# Patient Record
Sex: FEMALE | Race: WHITE | NOT HISPANIC OR LATINO | URBAN - METROPOLITAN AREA
[De-identification: names, ages, dates, MRNs, and addresses within clinical notes are randomized per-mention and may not be internally consistent; named-entity substitution may affect disease eponyms.]

---

## 2020-07-25 ENCOUNTER — TELEPHONE ENCOUNTER (OUTPATIENT)
Dept: URBAN - METROPOLITAN AREA CLINIC 13 | Facility: CLINIC | Age: 45
End: 2020-07-25

## 2020-07-26 ENCOUNTER — TELEPHONE ENCOUNTER (OUTPATIENT)
Dept: URBAN - METROPOLITAN AREA CLINIC 13 | Facility: CLINIC | Age: 45
End: 2020-07-26

## 2020-07-26 RX ORDER — FLUTICASONE PROPIONATE AND SALMETEROL 50; 100 UG/1; UG/1
POWDER RESPIRATORY (INHALATION)
Qty: 60 | Refills: 0 | Status: ACTIVE | COMMUNITY
Start: 2020-01-20

## 2020-07-26 RX ORDER — SODIUM SULFATE, POTASSIUM SULFATE, MAGNESIUM SULFATE 17.5; 3.13; 1.6 G/ML; G/ML; G/ML
DILUTE CONTENTS AND USE AS DIRECTED FOR BOWEL PREP SOLUTION, CONCENTRATE ORAL
Qty: 1 | Refills: 0 | Status: ACTIVE | COMMUNITY
Start: 2020-01-27

## 2020-07-26 RX ORDER — ALBUTEROL SULFATE 90 UG/1
AEROSOL, METERED RESPIRATORY (INHALATION)
Qty: 18 | Refills: 0 | Status: ACTIVE | COMMUNITY
Start: 2020-01-07

## 2022-12-27 ENCOUNTER — HOSPITAL ENCOUNTER (EMERGENCY)
Facility: HOSPITAL | Age: 47
Discharge: HOME | End: 2022-12-27
Attending: EMERGENCY MEDICINE | Admitting: EMERGENCY MEDICINE

## 2022-12-27 ENCOUNTER — APPOINTMENT (OUTPATIENT)
Dept: CT IMAGING | Facility: HOSPITAL | Age: 47
End: 2022-12-27

## 2022-12-27 ENCOUNTER — APPOINTMENT (OUTPATIENT)
Dept: ULTRASOUND IMAGING | Facility: HOSPITAL | Age: 47
End: 2022-12-27

## 2022-12-27 VITALS
SYSTOLIC BLOOD PRESSURE: 116 MMHG | HEIGHT: 70 IN | TEMPERATURE: 98.9 F | RESPIRATION RATE: 16 BRPM | HEART RATE: 55 BPM | WEIGHT: 218 LBS | DIASTOLIC BLOOD PRESSURE: 67 MMHG | BODY MASS INDEX: 31.21 KG/M2 | OXYGEN SATURATION: 84 %

## 2022-12-27 DIAGNOSIS — R10.13 EPIGASTRIC PAIN: Primary | ICD-10-CM

## 2022-12-27 LAB
ALBUMIN SERPL-MCNC: 4.4 G/DL (ref 3.5–5.2)
ALBUMIN/GLOB SERPL: 1.5 G/DL
ALP SERPL-CCNC: 51 U/L (ref 39–117)
ALT SERPL W P-5'-P-CCNC: 33 U/L (ref 1–33)
ANION GAP SERPL CALCULATED.3IONS-SCNC: 8 MMOL/L (ref 5–15)
AST SERPL-CCNC: 33 U/L (ref 1–32)
BASOPHILS # BLD AUTO: 0.02 10*3/MM3 (ref 0–0.2)
BASOPHILS NFR BLD AUTO: 0.3 % (ref 0–1.5)
BILIRUB SERPL-MCNC: 0.3 MG/DL (ref 0–1.2)
BILIRUB UR QL STRIP: NEGATIVE
BUN SERPL-MCNC: 8 MG/DL (ref 6–20)
BUN/CREAT SERPL: 12.3 (ref 7–25)
CALCIUM SPEC-SCNC: 9.4 MG/DL (ref 8.6–10.5)
CHLORIDE SERPL-SCNC: 106 MMOL/L (ref 98–107)
CLARITY UR: CLEAR
CO2 SERPL-SCNC: 27 MMOL/L (ref 22–29)
COLOR UR: YELLOW
CREAT SERPL-MCNC: 0.65 MG/DL (ref 0.57–1)
DEPRECATED RDW RBC AUTO: 41.7 FL (ref 37–54)
EGFRCR SERPLBLD CKD-EPI 2021: 108.1 ML/MIN/1.73
EOSINOPHIL # BLD AUTO: 0.1 10*3/MM3 (ref 0–0.4)
EOSINOPHIL NFR BLD AUTO: 1.5 % (ref 0.3–6.2)
ERYTHROCYTE [DISTWIDTH] IN BLOOD BY AUTOMATED COUNT: 11.6 % (ref 12.3–15.4)
GLOBULIN UR ELPH-MCNC: 2.9 GM/DL
GLUCOSE SERPL-MCNC: 90 MG/DL (ref 65–99)
GLUCOSE UR STRIP-MCNC: NEGATIVE MG/DL
HCT VFR BLD AUTO: 40.2 % (ref 34–46.6)
HGB BLD-MCNC: 13.2 G/DL (ref 12–15.9)
HGB UR QL STRIP.AUTO: NEGATIVE
HOLD SPECIMEN: NORMAL
IMM GRANULOCYTES # BLD AUTO: 0.02 10*3/MM3 (ref 0–0.05)
IMM GRANULOCYTES NFR BLD AUTO: 0.3 % (ref 0–0.5)
KETONES UR QL STRIP: NEGATIVE
LEUKOCYTE ESTERASE UR QL STRIP.AUTO: NEGATIVE
LIPASE SERPL-CCNC: 33 U/L (ref 13–60)
LYMPHOCYTES # BLD AUTO: 1.68 10*3/MM3 (ref 0.7–3.1)
LYMPHOCYTES NFR BLD AUTO: 24.9 % (ref 19.6–45.3)
MCH RBC QN AUTO: 31.9 PG (ref 26.6–33)
MCHC RBC AUTO-ENTMCNC: 32.8 G/DL (ref 31.5–35.7)
MCV RBC AUTO: 97.1 FL (ref 79–97)
MONOCYTES # BLD AUTO: 0.53 10*3/MM3 (ref 0.1–0.9)
MONOCYTES NFR BLD AUTO: 7.8 % (ref 5–12)
NEUTROPHILS NFR BLD AUTO: 4.41 10*3/MM3 (ref 1.7–7)
NEUTROPHILS NFR BLD AUTO: 65.2 % (ref 42.7–76)
NITRITE UR QL STRIP: NEGATIVE
NRBC BLD AUTO-RTO: 0 /100 WBC (ref 0–0.2)
PH UR STRIP.AUTO: 7.5 [PH] (ref 5–8)
PLATELET # BLD AUTO: 264 10*3/MM3 (ref 140–450)
PMV BLD AUTO: 9.7 FL (ref 6–12)
POTASSIUM SERPL-SCNC: 3.9 MMOL/L (ref 3.5–5.2)
PROT SERPL-MCNC: 7.3 G/DL (ref 6–8.5)
PROT UR QL STRIP: NEGATIVE
QT INTERVAL: 424 MS
QTC INTERVAL: 437 MS
RBC # BLD AUTO: 4.14 10*6/MM3 (ref 3.77–5.28)
SODIUM SERPL-SCNC: 141 MMOL/L (ref 136–145)
SP GR UR STRIP: 1.01 (ref 1–1.03)
TROPONIN T SERPL-MCNC: <0.01 NG/ML (ref 0–0.03)
UROBILINOGEN UR QL STRIP: NORMAL
WBC NRBC COR # BLD: 6.76 10*3/MM3 (ref 3.4–10.8)
WHOLE BLOOD HOLD COAG: NORMAL
WHOLE BLOOD HOLD SPECIMEN: NORMAL

## 2022-12-27 PROCEDURE — 84484 ASSAY OF TROPONIN QUANT: CPT | Performed by: EMERGENCY MEDICINE

## 2022-12-27 PROCEDURE — 96375 TX/PRO/DX INJ NEW DRUG ADDON: CPT

## 2022-12-27 PROCEDURE — 83690 ASSAY OF LIPASE: CPT | Performed by: EMERGENCY MEDICINE

## 2022-12-27 PROCEDURE — 25010000002 HYDROMORPHONE PER 4 MG: Performed by: EMERGENCY MEDICINE

## 2022-12-27 PROCEDURE — 76705 ECHO EXAM OF ABDOMEN: CPT

## 2022-12-27 PROCEDURE — 93005 ELECTROCARDIOGRAM TRACING: CPT | Performed by: EMERGENCY MEDICINE

## 2022-12-27 PROCEDURE — 96374 THER/PROPH/DIAG INJ IV PUSH: CPT

## 2022-12-27 PROCEDURE — 25010000002 ONDANSETRON PER 1 MG: Performed by: EMERGENCY MEDICINE

## 2022-12-27 PROCEDURE — 74177 CT ABD & PELVIS W/CONTRAST: CPT

## 2022-12-27 PROCEDURE — 25010000002 IOPAMIDOL 61 % SOLUTION: Performed by: EMERGENCY MEDICINE

## 2022-12-27 PROCEDURE — 85025 COMPLETE CBC W/AUTO DIFF WBC: CPT | Performed by: EMERGENCY MEDICINE

## 2022-12-27 PROCEDURE — 80053 COMPREHEN METABOLIC PANEL: CPT | Performed by: EMERGENCY MEDICINE

## 2022-12-27 PROCEDURE — 81003 URINALYSIS AUTO W/O SCOPE: CPT | Performed by: EMERGENCY MEDICINE

## 2022-12-27 PROCEDURE — 99284 EMERGENCY DEPT VISIT MOD MDM: CPT

## 2022-12-27 RX ORDER — DICYCLOMINE HCL 20 MG
20 TABLET ORAL EVERY 6 HOURS
Qty: 12 TABLET | Refills: 0 | Status: SHIPPED | OUTPATIENT
Start: 2022-12-27 | End: 2023-01-05

## 2022-12-27 RX ORDER — ALUMINA, MAGNESIA, AND SIMETHICONE 2400; 2400; 240 MG/30ML; MG/30ML; MG/30ML
15 SUSPENSION ORAL ONCE
Status: COMPLETED | OUTPATIENT
Start: 2022-12-27 | End: 2022-12-27

## 2022-12-27 RX ORDER — HYDROMORPHONE HYDROCHLORIDE 1 MG/ML
0.5 INJECTION, SOLUTION INTRAMUSCULAR; INTRAVENOUS; SUBCUTANEOUS ONCE
Status: COMPLETED | OUTPATIENT
Start: 2022-12-27 | End: 2022-12-27

## 2022-12-27 RX ORDER — ONDANSETRON 2 MG/ML
4 INJECTION INTRAMUSCULAR; INTRAVENOUS ONCE
Status: COMPLETED | OUTPATIENT
Start: 2022-12-27 | End: 2022-12-27

## 2022-12-27 RX ORDER — LIDOCAINE HYDROCHLORIDE 20 MG/ML
15 SOLUTION OROPHARYNGEAL ONCE
Status: COMPLETED | OUTPATIENT
Start: 2022-12-27 | End: 2022-12-27

## 2022-12-27 RX ORDER — PANTOPRAZOLE SODIUM 20 MG/1
20 TABLET, DELAYED RELEASE ORAL DAILY
Qty: 30 TABLET | Refills: 0 | Status: SHIPPED | OUTPATIENT
Start: 2022-12-27

## 2022-12-27 RX ORDER — SUCRALFATE 1 G/1
1 TABLET ORAL 4 TIMES DAILY
Qty: 40 TABLET | Refills: 0 | Status: SHIPPED | OUTPATIENT
Start: 2022-12-27

## 2022-12-27 RX ORDER — ALBUTEROL SULFATE 90 UG/1
2 AEROSOL, METERED RESPIRATORY (INHALATION) EVERY 4 HOURS PRN
COMMUNITY

## 2022-12-27 RX ORDER — SODIUM CHLORIDE 0.9 % (FLUSH) 0.9 %
10 SYRINGE (ML) INJECTION AS NEEDED
Status: DISCONTINUED | OUTPATIENT
Start: 2022-12-27 | End: 2022-12-27 | Stop reason: HOSPADM

## 2022-12-27 RX ADMIN — ONDANSETRON 4 MG: 2 INJECTION INTRAMUSCULAR; INTRAVENOUS at 12:19

## 2022-12-27 RX ADMIN — LIDOCAINE HYDROCHLORIDE 15 ML: 20 SOLUTION ORAL; TOPICAL at 12:18

## 2022-12-27 RX ADMIN — HYDROMORPHONE HYDROCHLORIDE 0.5 MG: 1 INJECTION, SOLUTION INTRAMUSCULAR; INTRAVENOUS; SUBCUTANEOUS at 12:27

## 2022-12-27 RX ADMIN — ALUMINUM HYDROXIDE, MAGNESIUM HYDROXIDE, AND DIMETHICONE 15 ML: 400; 400; 40 SUSPENSION ORAL at 12:17

## 2022-12-27 RX ADMIN — IOPAMIDOL 85 ML: 612 INJECTION, SOLUTION INTRAVENOUS at 14:39

## 2022-12-27 NOTE — ED PROVIDER NOTES
Subjective   History of Present Illness  47-year-old female presents emergency department today with a 2-day history of epigastric pain.  She reports the pain is severe at colicky in nature.  She reports it does not radiate through to her back.  She has no chest pain no shortness of breath no diaphoresis associated with this.  Food seems to make this little bit worse.  She is tried Tums over-the-counter antacids and Tylenol without a lot of relief.  No previous abdominal surgeries.  She states that her abdomen is tender to palpation.  She has had no chest lightheadedness.  No fevers no chills denies any urinary symptoms associated with this.    History provided by:  Patient   used: No    Abdominal Pain  Pain location:  Epigastric and RUQ  Pain quality: aching and dull    Pain radiates to:  Does not radiate  Pain severity:  Severe  Onset quality:  Gradual  Duration:  2 days  Timing:  Constant  Progression:  Waxing and waning  Chronicity:  New  Context: not alcohol use, not diet changes, not eating, not previous surgeries, not recent illness, not recent travel, not sick contacts, not suspicious food intake and not trauma    Relieved by:  Nothing  Worsened by:  Eating  Ineffective treatments:  None tried  Associated symptoms: anorexia and nausea    Associated symptoms: no chest pain, no chills, no constipation, no cough, no diarrhea, no dysuria, no fever, no flatus, no hematemesis, no hematochezia, no hematuria, no shortness of breath and no vomiting        Review of Systems   Constitutional: Negative for chills and fever.   Respiratory: Negative for cough, chest tightness and shortness of breath.    Cardiovascular: Negative for chest pain and palpitations.   Gastrointestinal: Positive for abdominal pain, anorexia and nausea. Negative for constipation, diarrhea, flatus, hematemesis, hematochezia and vomiting.   Genitourinary: Negative for dysuria, frequency, hematuria and urgency.   Musculoskeletal:  Negative for back pain and neck pain.   Skin: Negative for pallor and rash.   Psychiatric/Behavioral: Negative.    All other systems reviewed and are negative.      No past medical history on file.    Allergies   Allergen Reactions   • Influenza Vaccines Shortness Of Breath and Palpitations     PT HOSPITALIZED DUE TO INFLUENZA VACCINE. PT UNSURE WHICH VACCINE SHE TOOK.    • Codeine Swelling     SWELLING IN THROAT   12/27 nurse interviewed pt who stated she has had hydromorphone in the past with no issues   • Zithromax [Azithromycin] Swelling     SWELLING IN THROAT       No past surgical history on file.    No family history on file.    Social History     Socioeconomic History   • Marital status:            Objective   Physical Exam  Vitals and nursing note reviewed.   Constitutional:       Appearance: She is well-developed.      Comments: Warm pink dry appears uncomfortable holding her epigastrium.   HENT:      Head: Normocephalic and atraumatic.      Right Ear: External ear normal.      Left Ear: External ear normal.      Nose: Nose normal.   Eyes:      General: No scleral icterus.     Conjunctiva/sclera: Conjunctivae normal.   Neck:      Thyroid: No thyromegaly.   Cardiovascular:      Rate and Rhythm: Normal rate and regular rhythm.      Heart sounds: Normal heart sounds.   Pulmonary:      Effort: Pulmonary effort is normal. No respiratory distress.      Breath sounds: Normal breath sounds. No wheezing or rales.   Chest:      Chest wall: No tenderness.   Abdominal:      General: Bowel sounds are normal. There is no distension.      Palpations: Abdomen is soft.      Tenderness: There is abdominal tenderness in the right upper quadrant and epigastric area. There is no right CVA tenderness, left CVA tenderness, guarding or rebound. Negative signs include Law's sign, Rovsing's sign, McBurney's sign, psoas sign and obturator sign.      Hernia: No hernia is present.   Musculoskeletal:         General: Normal  range of motion.      Cervical back: Normal range of motion.   Lymphadenopathy:      Cervical: No cervical adenopathy.   Skin:     General: Skin is warm and dry.   Neurological:      Mental Status: She is alert and oriented to person, place, and time.      Cranial Nerves: No cranial nerve deficit.      Coordination: Coordination normal.      Deep Tendon Reflexes: Reflexes are normal and symmetric. Reflexes normal.   Psychiatric:         Behavior: Behavior normal.         Thought Content: Thought content normal.         Judgment: Judgment normal.         Procedures           ED Course      Recent Results (from the past 24 hour(s))   Urinalysis With Microscopic If Indicated (No Culture) - Urine, Clean Catch    Collection Time: 12/27/22 11:35 AM    Specimen: Urine, Clean Catch   Result Value Ref Range    Color, UA Yellow Yellow, Straw    Appearance, UA Clear Clear    pH, UA 7.5 5.0 - 8.0    Specific Gravity, UA 1.006 1.001 - 1.030    Glucose, UA Negative Negative    Ketones, UA Negative Negative    Bilirubin, UA Negative Negative    Blood, UA Negative Negative    Protein, UA Negative Negative    Leuk Esterase, UA Negative Negative    Nitrite, UA Negative Negative    Urobilinogen, UA 0.2 E.U./dL 0.2 - 1.0 E.U./dL   ECG 12 Lead ED Triage Standing Order; Abdominal Pain (Upper)    Collection Time: 12/27/22 11:39 AM   Result Value Ref Range    QT Interval 424 ms    QTC Interval 437 ms   Comprehensive Metabolic Panel    Collection Time: 12/27/22 11:45 AM    Specimen: Blood   Result Value Ref Range    Glucose 90 65 - 99 mg/dL    BUN 8 6 - 20 mg/dL    Creatinine 0.65 0.57 - 1.00 mg/dL    Sodium 141 136 - 145 mmol/L    Potassium 3.9 3.5 - 5.2 mmol/L    Chloride 106 98 - 107 mmol/L    CO2 27.0 22.0 - 29.0 mmol/L    Calcium 9.4 8.6 - 10.5 mg/dL    Total Protein 7.3 6.0 - 8.5 g/dL    Albumin 4.4 3.5 - 5.2 g/dL    ALT (SGPT) 33 1 - 33 U/L    AST (SGOT) 33 (H) 1 - 32 U/L    Alkaline Phosphatase 51 39 - 117 U/L    Total Bilirubin 0.3  0.0 - 1.2 mg/dL    Globulin 2.9 gm/dL    A/G Ratio 1.5 g/dL    BUN/Creatinine Ratio 12.3 7.0 - 25.0    Anion Gap 8.0 5.0 - 15.0 mmol/L    eGFR 108.1 >60.0 mL/min/1.73   Lipase    Collection Time: 12/27/22 11:45 AM    Specimen: Blood   Result Value Ref Range    Lipase 33 13 - 60 U/L   Troponin    Collection Time: 12/27/22 11:45 AM    Specimen: Blood   Result Value Ref Range    Troponin T <0.010 0.000 - 0.030 ng/mL   Green Top (Gel)    Collection Time: 12/27/22 11:45 AM   Result Value Ref Range    Extra Tube Hold for add-ons.    Lavender Top    Collection Time: 12/27/22 11:45 AM   Result Value Ref Range    Extra Tube hold for add-on    Gold Top - SST    Collection Time: 12/27/22 11:45 AM   Result Value Ref Range    Extra Tube Hold for add-ons.    Gray Top    Collection Time: 12/27/22 11:45 AM   Result Value Ref Range    Extra Tube Hold for add-ons.    Light Blue Top    Collection Time: 12/27/22 11:45 AM   Result Value Ref Range    Extra Tube Hold for add-ons.    CBC Auto Differential    Collection Time: 12/27/22 11:45 AM    Specimen: Blood   Result Value Ref Range    WBC 6.76 3.40 - 10.80 10*3/mm3    RBC 4.14 3.77 - 5.28 10*6/mm3    Hemoglobin 13.2 12.0 - 15.9 g/dL    Hematocrit 40.2 34.0 - 46.6 %    MCV 97.1 (H) 79.0 - 97.0 fL    MCH 31.9 26.6 - 33.0 pg    MCHC 32.8 31.5 - 35.7 g/dL    RDW 11.6 (L) 12.3 - 15.4 %    RDW-SD 41.7 37.0 - 54.0 fl    MPV 9.7 6.0 - 12.0 fL    Platelets 264 140 - 450 10*3/mm3    Neutrophil % 65.2 42.7 - 76.0 %    Lymphocyte % 24.9 19.6 - 45.3 %    Monocyte % 7.8 5.0 - 12.0 %    Eosinophil % 1.5 0.3 - 6.2 %    Basophil % 0.3 0.0 - 1.5 %    Immature Grans % 0.3 0.0 - 0.5 %    Neutrophils, Absolute 4.41 1.70 - 7.00 10*3/mm3    Lymphocytes, Absolute 1.68 0.70 - 3.10 10*3/mm3    Monocytes, Absolute 0.53 0.10 - 0.90 10*3/mm3    Eosinophils, Absolute 0.10 0.00 - 0.40 10*3/mm3    Basophils, Absolute 0.02 0.00 - 0.20 10*3/mm3    Immature Grans, Absolute 0.02 0.00 - 0.05 10*3/mm3    nRBC 0.0 0.0 - 0.2  /100 WBC     Note: In addition to lab results from this visit, the labs listed above may include labs taken at another facility or during a different encounter within the last 24 hours. Please correlate lab times with ED admission and discharge times for further clarification of the services performed during this visit.    CT Abdomen Pelvis With Contrast   Final Result       1. Mild prominence of the common bile duct likely related to   postcholecystectomy ectasia. Please correlate with bilirubin levels.   Additional evaluation can be performed as clinically indicated.   2. Diffuse hepatic steatosis. Please correlate with liver function test.   3. Fluid within the pelvis along the vaginal cuff likely postoperative   in nature. Follow-up pelvic ultrasound should be considered on a   nonemergent basis to further evaluate               This report was finalized on 12/27/2022 2:53 PM by Yovany Velazquez.          US Gallbladder   Final Result       1. Diffusely echogenic liver parenchyma typical fatty liver change.   2. Gallbladder is not directly visible. Suggestion of a wall-echo-shadow   complex in the gallbladder fossa which may reflect a contracted   gallbladder filled with gallstones. As discussed above, this appearance   can be mimicked by gas in adjacent bowel. Consider CT scan or MRI   scanning for further evaluation.       This report was finalized on 12/27/2022 1:18 PM by Dr. Victoriano Benitez MD.            Vitals:    12/27/22 1200 12/27/22 1230 12/27/22 1330 12/27/22 1400   BP: 125/68 124/60 130/71 116/67   BP Location:       Patient Position:       Pulse: 68 76 73 55   Resp:       Temp:       TempSrc:       SpO2: 100% 100% 99% (!) 84%   Weight:       Height:         Medications   aluminum-magnesium hydroxide-simethicone (MAALOX MAX) 400-400-40 MG/5ML suspension 15 mL (15 mL Oral Given 12/27/22 1217)   Lidocaine Viscous HCl (XYLOCAINE) 2 % solution 15 mL (15 mL Mouth/Throat Given 12/27/22 1218)   HYDROmorphone  (DILAUDID) injection 0.5 mg (0.5 mg Intravenous Given 12/27/22 1227)   ondansetron (ZOFRAN) injection 4 mg (4 mg Intravenous Given 12/27/22 1219)   iopamidol (ISOVUE-300) 61 % injection 85 mL (85 mL Intravenous Given 12/27/22 1439)     ECG/EMG Results (last 24 hours)     Procedure Component Value Units Date/Time    ECG 12 Lead ED Triage Standing Order; Abdominal Pain (Upper) [[id number]] Collected: 12/27/22 1139     Updated: 12/27/22 1455     QT Interval 424 ms      QTC Interval 437 ms     Narrative:      Test Reason : ABD PAIN  Blood Pressure :   */*   mmHG  Vent. Rate :  64 BPM     Atrial Rate :  64 BPM     P-R Int : 140 ms          QRS Dur :  88 ms      QT Int : 424 ms       P-R-T Axes :  61  21  25 degrees     QTc Int : 437 ms    Normal sinus rhythm  Normal ECG  No previous ECGs available  Confirmed by MD Jett Michael ([id number]) on 12/27/2022 2:55:05 PM    Referred By: EDMD           Confirmed By: Chato Jett MD        ECG 12 Lead ED Triage Standing Order; Abdominal Pain (Upper)   Final Result   Test Reason : ABD PAIN   Blood Pressure :   */*   mmHG   Vent. Rate :  64 BPM     Atrial Rate :  64 BPM      P-R Int : 140 ms          QRS Dur :  88 ms       QT Int : 424 ms       P-R-T Axes :  61  21  25 degrees      QTc Int : 437 ms      Normal sinus rhythm   Normal ECG   No previous ECGs available   Confirmed by MD Jett Michael ([id number]) on 12/27/2022 2:55:05 PM      Referred By: EDMD           Confirmed By: Chato Jett MD                                     Recent Results (from the past 24 hour(s))   Urinalysis With Microscopic If Indicated (No Culture) - Urine, Clean Catch    Collection Time: 12/27/22 11:35 AM    Specimen: Urine, Clean Catch   Result Value Ref Range    Color, UA Yellow Yellow, Straw    Appearance, UA Clear Clear    pH, UA 7.5 5.0 - 8.0    Specific Gravity, UA 1.006 1.001 - 1.030    Glucose, UA Negative Negative    Ketones, UA Negative Negative    Bilirubin, UA Negative Negative    Blood, UA Negative  Negative    Protein, UA Negative Negative    Leuk Esterase, UA Negative Negative    Nitrite, UA Negative Negative    Urobilinogen, UA 0.2 E.U./dL 0.2 - 1.0 E.U./dL   ECG 12 Lead ED Triage Standing Order; Abdominal Pain (Upper)    Collection Time: 12/27/22 11:39 AM   Result Value Ref Range    QT Interval 424 ms    QTC Interval 437 ms   Comprehensive Metabolic Panel    Collection Time: 12/27/22 11:45 AM    Specimen: Blood   Result Value Ref Range    Glucose 90 65 - 99 mg/dL    BUN 8 6 - 20 mg/dL    Creatinine 0.65 0.57 - 1.00 mg/dL    Sodium 141 136 - 145 mmol/L    Potassium 3.9 3.5 - 5.2 mmol/L    Chloride 106 98 - 107 mmol/L    CO2 27.0 22.0 - 29.0 mmol/L    Calcium 9.4 8.6 - 10.5 mg/dL    Total Protein 7.3 6.0 - 8.5 g/dL    Albumin 4.4 3.5 - 5.2 g/dL    ALT (SGPT) 33 1 - 33 U/L    AST (SGOT) 33 (H) 1 - 32 U/L    Alkaline Phosphatase 51 39 - 117 U/L    Total Bilirubin 0.3 0.0 - 1.2 mg/dL    Globulin 2.9 gm/dL    A/G Ratio 1.5 g/dL    BUN/Creatinine Ratio 12.3 7.0 - 25.0    Anion Gap 8.0 5.0 - 15.0 mmol/L    eGFR 108.1 >60.0 mL/min/1.73   Lipase    Collection Time: 12/27/22 11:45 AM    Specimen: Blood   Result Value Ref Range    Lipase 33 13 - 60 U/L   Troponin    Collection Time: 12/27/22 11:45 AM    Specimen: Blood   Result Value Ref Range    Troponin T <0.010 0.000 - 0.030 ng/mL   Green Top (Gel)    Collection Time: 12/27/22 11:45 AM   Result Value Ref Range    Extra Tube Hold for add-ons.    Lavender Top    Collection Time: 12/27/22 11:45 AM   Result Value Ref Range    Extra Tube hold for add-on    Gold Top - SST    Collection Time: 12/27/22 11:45 AM   Result Value Ref Range    Extra Tube Hold for add-ons.    Gray Top    Collection Time: 12/27/22 11:45 AM   Result Value Ref Range    Extra Tube Hold for add-ons.    Light Blue Top    Collection Time: 12/27/22 11:45 AM   Result Value Ref Range    Extra Tube Hold for add-ons.    CBC Auto Differential    Collection Time: 12/27/22 11:45 AM    Specimen: Blood   Result  Value Ref Range    WBC 6.76 3.40 - 10.80 10*3/mm3    RBC 4.14 3.77 - 5.28 10*6/mm3    Hemoglobin 13.2 12.0 - 15.9 g/dL    Hematocrit 40.2 34.0 - 46.6 %    MCV 97.1 (H) 79.0 - 97.0 fL    MCH 31.9 26.6 - 33.0 pg    MCHC 32.8 31.5 - 35.7 g/dL    RDW 11.6 (L) 12.3 - 15.4 %    RDW-SD 41.7 37.0 - 54.0 fl    MPV 9.7 6.0 - 12.0 fL    Platelets 264 140 - 450 10*3/mm3    Neutrophil % 65.2 42.7 - 76.0 %    Lymphocyte % 24.9 19.6 - 45.3 %    Monocyte % 7.8 5.0 - 12.0 %    Eosinophil % 1.5 0.3 - 6.2 %    Basophil % 0.3 0.0 - 1.5 %    Immature Grans % 0.3 0.0 - 0.5 %    Neutrophils, Absolute 4.41 1.70 - 7.00 10*3/mm3    Lymphocytes, Absolute 1.68 0.70 - 3.10 10*3/mm3    Monocytes, Absolute 0.53 0.10 - 0.90 10*3/mm3    Eosinophils, Absolute 0.10 0.00 - 0.40 10*3/mm3    Basophils, Absolute 0.02 0.00 - 0.20 10*3/mm3    Immature Grans, Absolute 0.02 0.00 - 0.05 10*3/mm3    nRBC 0.0 0.0 - 0.2 /100 WBC     Note: In addition to lab results from this visit, the labs listed above may include labs taken at another facility or during a different encounter within the last 24 hours. Please correlate lab times with ED admission and discharge times for further clarification of the services performed during this visit.    CT Abdomen Pelvis With Contrast   Final Result       1. Mild prominence of the common bile duct likely related to   postcholecystectomy ectasia. Please correlate with bilirubin levels.   Additional evaluation can be performed as clinically indicated.   2. Diffuse hepatic steatosis. Please correlate with liver function test.   3. Fluid within the pelvis along the vaginal cuff likely postoperative   in nature. Follow-up pelvic ultrasound should be considered on a   nonemergent basis to further evaluate               This report was finalized on 12/27/2022 2:53 PM by Yovany Velazquez.          US Gallbladder   Final Result       1. Diffusely echogenic liver parenchyma typical fatty liver change.   2. Gallbladder is not directly  visible. Suggestion of a wall-echo-shadow   complex in the gallbladder fossa which may reflect a contracted   gallbladder filled with gallstones. As discussed above, this appearance   can be mimicked by gas in adjacent bowel. Consider CT scan or MRI   scanning for further evaluation.       This report was finalized on 12/27/2022 1:18 PM by Dr. Victoriano Benitez MD.            Vitals:    12/27/22 1200 12/27/22 1230 12/27/22 1330 12/27/22 1400   BP: 125/68 124/60 130/71 116/67   BP Location:       Patient Position:       Pulse: 68 76 73 55   Resp:       Temp:       TempSrc:       SpO2: 100% 100% 99% (!) 84%   Weight:       Height:         Medications   aluminum-magnesium hydroxide-simethicone (MAALOX MAX) 400-400-40 MG/5ML suspension 15 mL (15 mL Oral Given 12/27/22 1217)   Lidocaine Viscous HCl (XYLOCAINE) 2 % solution 15 mL (15 mL Mouth/Throat Given 12/27/22 1218)   HYDROmorphone (DILAUDID) injection 0.5 mg (0.5 mg Intravenous Given 12/27/22 1227)   ondansetron (ZOFRAN) injection 4 mg (4 mg Intravenous Given 12/27/22 1219)   iopamidol (ISOVUE-300) 61 % injection 85 mL (85 mL Intravenous Given 12/27/22 1439)     ECG/EMG Results (last 24 hours)     Procedure Component Value Units Date/Time    ECG 12 Lead ED Triage Standing Order; Abdominal Pain (Upper) [[id number]] Collected: 12/27/22 1139     Updated: 12/27/22 1455     QT Interval 424 ms      QTC Interval 437 ms     Narrative:      Test Reason : ABD PAIN  Blood Pressure :   */*   mmHG  Vent. Rate :  64 BPM     Atrial Rate :  64 BPM     P-R Int : 140 ms          QRS Dur :  88 ms      QT Int : 424 ms       P-R-T Axes :  61  21  25 degrees     QTc Int : 437 ms    Normal sinus rhythm  Normal ECG  No previous ECGs available  Confirmed by MD Jett Michael ([id number]) on 12/27/2022 2:55:05 PM    Referred By: EDMD           Confirmed By: Chato Jett MD        ECG 12 Lead ED Triage Standing Order; Abdominal Pain (Upper)   Final Result   Test Reason : ABD PAIN   Blood Pressure :   */*    mmHG   Vent. Rate :  64 BPM     Atrial Rate :  64 BPM      P-R Int : 140 ms          QRS Dur :  88 ms       QT Int : 424 ms       P-R-T Axes :  61  21  25 degrees      QTc Int : 437 ms      Normal sinus rhythm   Normal ECG   No previous ECGs available   Confirmed by MD Jett Michael ([id number]) on 12/27/2022 2:55:05 PM      Referred By: EDMD           Confirmed By: Chato Jett MD                    MDM  Number of Diagnoses or Management Options  Epigastric pain: new and requires workup     Amount and/or Complexity of Data Reviewed  Clinical lab tests: reviewed and ordered  Tests in the radiology section of CPT®: ordered and reviewed  Tests in the medicine section of CPT®: ordered and reviewed  Discuss the patient with other providers: yes    Patient Progress  Patient progress: stable      Final diagnoses:   Epigastric pain       ED Disposition  ED Disposition     ED Disposition   Discharge    Condition   Stable    Comment   --             [facility] GA[facility] [address]  [address]  [facility] [address]  [phone]        Gatito Perez MD  [address] [place] [address] [address] [place] [address] [phone]      to discuss HIDA scan         Medication List      New Prescriptions    dicyclomine 20 MG tablet  Commonly known as: BENTYL  Take 1 tablet by mouth Every 6 (Six) Hours.     pantoprazole 20 MG EC tablet  Commonly known as: PROTONIX  Take 1 tablet by mouth Daily.     sucralfate 1 g tablet  Commonly known as: CARAFATE  Take 1 tablet by mouth 4 (Four) Times a Day.           Where to Get Your Medications      These medications were sent to [facility] - [place], [place] - [address] [facility] [address] - [phone]  - [phone] [address] [address]    Phone: [phone]   · dicyclomine 20 MG tablet  · pantoprazole 20 MG EC tablet  · sucralfate 1 g tablet          Abe Powers PA  12/28/22 3909

## 2023-01-05 ENCOUNTER — OFFICE VISIT (OUTPATIENT)
Dept: ORTHOPEDIC SURGERY | Facility: CLINIC | Age: 48
End: 2023-01-05
Payer: OTHER GOVERNMENT

## 2023-01-05 VITALS
SYSTOLIC BLOOD PRESSURE: 101 MMHG | BODY MASS INDEX: 31.21 KG/M2 | WEIGHT: 218 LBS | HEIGHT: 70 IN | DIASTOLIC BLOOD PRESSURE: 71 MMHG

## 2023-01-05 DIAGNOSIS — M70.61 GREATER TROCHANTERIC BURSITIS OF RIGHT HIP: ICD-10-CM

## 2023-01-05 DIAGNOSIS — M67.951 TENDINOPATHY OF RIGHT GLUTEUS MEDIUS: Primary | ICD-10-CM

## 2023-01-05 PROCEDURE — 99204 OFFICE O/P NEW MOD 45 MIN: CPT | Performed by: STUDENT IN AN ORGANIZED HEALTH CARE EDUCATION/TRAINING PROGRAM

## 2023-01-05 RX ORDER — CELECOXIB 200 MG/1
200 CAPSULE ORAL
Qty: 60 CAPSULE | Refills: 1 | Status: SHIPPED | OUTPATIENT
Start: 2023-01-05

## 2023-01-05 RX ORDER — BISOPROLOL FUMARATE 5 MG/1
TABLET, FILM COATED ORAL
COMMUNITY
Start: 2022-11-29

## 2023-01-05 RX ORDER — LEVOCETIRIZINE DIHYDROCHLORIDE 5 MG/1
TABLET, FILM COATED ORAL
COMMUNITY

## 2023-01-05 NOTE — PROGRESS NOTES
[facility] Office Visit     Office Visit       Date: 01/05/2023   Patient Name: Alyssa Somers  MRN: [id number]  YOB: 1975    Referring Physician: Gatito Perez MD     Chief Complaint:   Chief Complaint   Patient presents with   • Right Hip - Pain       History of Present Illness:   Alyssa Somers is a 47 y.o. female who presents with right hip pain for 4 month(s). Onset atraumatic and gradual in nature. Pain is localized to lateral trochanter and is a 4/10 on the pain scale.Pain is described as burning. Associated symptoms include pain, stiffness and giving way/buckling.  The pain is worse with walking, standing, sitting, climbing stairs, sleeping, working and leisure; Prednisone improve the pain. Previous treatments have included: NSAIDS and oral steroids since symptom onset. Although some transient relief was reported with these interventions, these conservative measures have failed and symptoms have persisted. The patient is limited in daily activities and has had a significant decrease in quality of life as a result. She denies fevers, chills, or constitutional symptoms.    Subjective   Review of Systems: Review of Systems   Constitutional: Negative for chills, fever, unexpected weight gain and unexpected weight loss.   HENT: Negative for congestion, postnasal drip and rhinorrhea.    Eyes: Negative for blurred vision.   Respiratory: Negative for shortness of breath.    Cardiovascular: Negative for leg swelling.   Gastrointestinal: Negative for abdominal pain, nausea and vomiting.   Genitourinary: Negative for difficulty urinating.   Musculoskeletal: Positive for arthralgias. Negative for gait problem, joint swelling and myalgias.   Skin: Negative for skin lesions and wound.   Neurological: Negative for dizziness, weakness, light-headedness and numbness.   Hematological: Does not bruise/bleed easily.   Psychiatric/Behavioral: Negative for  depressed mood.        I have reviewed the following portions of the patient's history:History of Present Illness and review of systems.    Past Medical History:   Past Medical History:   Diagnosis Date   • Tear of meniscus of knee 06/2020    Left knee surgery 6/30/2020 in [place]       Past Surgical History:   Past Surgical History:   Procedure Laterality Date   • HYSTERECTOMY     • KNEE SURGERY Left 06/30/2020    arthroscopy   • TONSILLECTOMY AND ADENOIDECTOMY         Family History:   Family History   Problem Relation Age of Onset   • Cancer Mother        Social History:   Social History     Socioeconomic History   • Marital status:    Tobacco Use   • Smoking status: Never   • Smokeless tobacco: Never   Vaping Use   • Vaping Use: Never used   Substance and Sexual Activity   • Alcohol use: Not Currently   • Drug use: Never   • Sexual activity: Yes     Partners: Male     Birth control/protection: Hysterectomy       Medications:   Current Outpatient Medications:   •  albuterol sulfate  (90 Base) MCG/ACT inhaler, Inhale 2 puffs Every 4 (Four) Hours As Needed for Wheezing., Disp: , Rfl:   •  bisoprolol (ZEBeta) 5 MG tablet, , Disp: , Rfl:   •  Fluticasone-Umeclidin-Vilant (TRELEGY ELLIPTA IN), Inhale 100 mcg., Disp: , Rfl:   •  levocetirizine (XYZAL) 5 MG tablet, Xyzal 5 mg tablet  Take 1 tablet every day by oral route at bedtime., Disp: , Rfl:   •  pantoprazole (PROTONIX) 20 MG EC tablet, Take 1 tablet by mouth Daily., Disp: 30 tablet, Rfl: 0  •  Rimegepant Sulfate (NURTEC PO), Take 75 mg by mouth As Needed., Disp: , Rfl:   •  sucralfate (CARAFATE) 1 g tablet, Take 1 tablet by mouth 4 (Four) Times a Day., Disp: 40 tablet, Rfl: 0  •  BISOPROLOL FUMARATE PO, Take 5 mg by mouth., Disp: , Rfl:   •  celecoxib (CeleBREX) 200 MG capsule, Take 1 capsule by mouth Daily With Breakfast. Take 1 tablet 2 times per day., Disp: 60 capsule, Rfl: 1    Allergies:   Allergies   Allergen Reactions   • Influenza  Vaccines Shortness Of Breath and Palpitations     PT HOSPITALIZED DUE TO INFLUENZA VACCINE. PT UNSURE WHICH VACCINE SHE TOOK.    • Codeine Swelling     SWELLING IN THROAT   12/27 nurse interviewed pt who stated she has had hydromorphone in the past with no issues   • Zithromax [Azithromycin] Swelling     SWELLING IN THROAT       I reviewed the patient's chief complaint, history of present illness, review of systems, past medical history, surgical history, family history, social history, medications and allergy list.     Objective    Vital Signs:   Vitals:    01/05/23 1539   BP: 101/71   Weight: 98.9 kg (218 lb)   Height: 177.8 cm (70\")     Body mass index is 31.28 kg/m².   BMI is >= 30 and <35. (Class 1 Obesity). The following options were offered after discussion;: exercise counseling/recommendations and nutrition counseling/recommendations     Patient reports that she is a non-smoker and has not ever been a smoker.  This behavior was applauded and she was encouraged to continue in smoking cessation.  We will continue to monitor at subsequent visits.    Ortho Exam:  Constitutional: General Appearance: healthy-appearing and NAD.   Psychiatric: Orientation: oriented to time, place, and person. Mood and Affect: normal affect and mood and active and alert.   Gait and Station: Appearance: ambulating with no assistive devices and limp.   Hip/Pelvis Appearance: Inspection: normal axial alignment and pelvis level.   Hips: Bony Palpation Right: tenderness of the greater trochanter. Bony Palpation Left: no tenderness of the iliac crest, the ASIS, the PSIS, the pubic tubercle, the sciatic notch, the ischial tuberosity, the SI joint, or the greater trochanter. Passive Range of Motion Right: no flexion contracture, hamstring tightness popliteal angle, or pain with motion and normal, flexion normal, extension normal, internal rotation normal, and external rotation normal. Passive Range of Motion Left: no flexion contracture,  hamstring tightness popliteal angle, or pain with motion and normal, flexion normal, extension normal, internal rotation normal, and external rotation normal. Strength Right: normal 5/5. Strength Left: normal 5/5.   Skin: Right Lower Extremity: normal. Left Lower Extremity: normal.   right hip exam:   Normal hip contour without evidence of ecchymosis or swelling.   Range of motion of the hip shows pain only is exacerbated with Mayte test, Straight Leg Raise.   Negative log roll, FADIR.   Hip flexion 110°, internal rotation 10°, external rotation 60°.   Tenderness to palpation greatest at the greater trochanter region.   Mild tenderness along the iliotibial band.   Mild tenderness at the gluteal region.   Negative tenderness at the ischial tuberosity.   Negative SI joint tenderness to palpation.  left hip exam:   Normal hip contour without evidence of swelling or echymosis.   Full range of motion without exacerbation of pain.   Negative tenderness to palpation throughout.    Results Review:   Imaging Results (Last 24 Hours)     ** No results found for the last 24 hours. **        Procedures    Assessment / Plan    Assessment/Plan:   Diagnoses and all orders for this visit:    1. Tendinopathy of right gluteus medius (Primary)  -     Ambulatory Referral to Physical Therapy Evaluate and treat, Ortho; Electrotherapy; E-stim, Iontophoresis; Soft Tissue Mobilizaton; Stretching, ROM, Strengthening  -     celecoxib (CeleBREX) 200 MG capsule; Take 1 capsule by mouth Daily With Breakfast. Take 1 tablet 2 times per day.  Dispense: 60 capsule; Refill: 1    2. Greater trochanteric bursitis of right hip      Almost 6 months of right lateral hip pain worsened by standing walking any movement of the hip.  Radiographs from outside facility do show degenerative changes in the hip joint but this is not a generator of her pain.  On exam, manipulation of the hip reproduces pain at the lateral hip/greater trochanter.      We discussed my  findings and treatment options. Treatment always begins with nonoperative management which includes ITB stretches, NSAIDs, PT if deemed necessary. Local steroid injections can also be effective and were discussed. We discussed the role that gait disturbance can play in causation. Surgical Treatment is reserved to very intractible cases.    At this time, we will initiate treatment with physical therapy to improve her range of motion and decrease her pain threshold.  I will prescribe an anti-inflammatory medicine that can be taken twice daily to improve her pain as well.  I plan to see her back in 5 weeks to monitor response and decide on additional treatments.  Should she have an incomplete response, I would likely look at ultrasound-guided injection into the subglute max bursa.    Past documentation reviewed: 12/8/2022-office visit-Gatito Perez MD.    Past imaging studies reviewed: 12/27/2022-glucose 90, creatinine 0.65, EGFR 108.1.    Past imaging studies reviewed: Radiographs of the right hip from 12/8/2022-moderate hip osteoarthritis, no spurring at the greater trochanter.    Follow Up:   Return in about 5 weeks (around 2/9/2023) for Recheck.      Lg Duff MD  [facility]

## 2023-01-18 ENCOUNTER — OFFICE (OUTPATIENT)
Dept: URBAN - METROPOLITAN AREA CLINIC 4 | Facility: CLINIC | Age: 48
End: 2023-01-18

## 2023-01-18 VITALS — WEIGHT: 213 LBS | SYSTOLIC BLOOD PRESSURE: 138 MMHG | DIASTOLIC BLOOD PRESSURE: 80 MMHG

## 2023-01-18 DIAGNOSIS — R10.13 EPIGASTRIC PAIN: ICD-10-CM

## 2023-01-18 DIAGNOSIS — R63.0 ANOREXIA: ICD-10-CM

## 2023-01-18 DIAGNOSIS — R10.11 RIGHT UPPER QUADRANT PAIN: ICD-10-CM

## 2023-01-18 DIAGNOSIS — K76.9 LIVER DISEASE, UNSPECIFIED: ICD-10-CM

## 2023-01-18 DIAGNOSIS — K83.9 DISEASE OF BILIARY TRACT, UNSPECIFIED: ICD-10-CM

## 2023-01-18 DIAGNOSIS — R63.4 ABNORMAL WEIGHT LOSS: ICD-10-CM

## 2023-01-18 PROCEDURE — 99204 OFFICE O/P NEW MOD 45 MIN: CPT | Performed by: NURSE PRACTITIONER

## 2023-01-23 ENCOUNTER — OFFICE (OUTPATIENT)
Dept: URBAN - METROPOLITAN AREA PATHOLOGY 4 | Facility: PATHOLOGY | Age: 48
End: 2023-01-23

## 2023-01-23 ENCOUNTER — AMBULATORY SURGICAL CENTER (OUTPATIENT)
Dept: URBAN - METROPOLITAN AREA SURGERY 10 | Facility: SURGERY | Age: 48
End: 2023-01-23

## 2023-01-23 DIAGNOSIS — K31.89 OTHER DISEASES OF STOMACH AND DUODENUM: ICD-10-CM

## 2023-01-23 DIAGNOSIS — K44.9 DIAPHRAGMATIC HERNIA WITHOUT OBSTRUCTION OR GANGRENE: ICD-10-CM

## 2023-01-23 DIAGNOSIS — K21.00 GASTRO-ESOPHAGEAL REFLUX DISEASE WITH ESOPHAGITIS, WITHOUT B: ICD-10-CM

## 2023-01-23 DIAGNOSIS — K29.70 GASTRITIS, UNSPECIFIED, WITHOUT BLEEDING: ICD-10-CM

## 2023-01-23 DIAGNOSIS — R10.13 EPIGASTRIC PAIN: ICD-10-CM

## 2023-01-23 PROCEDURE — 88305 TISSUE EXAM BY PATHOLOGIST: CPT | Performed by: INTERNAL MEDICINE

## 2023-01-23 PROCEDURE — 43239 EGD BIOPSY SINGLE/MULTIPLE: CPT | Performed by: INTERNAL MEDICINE

## 2023-01-24 PROBLEM — K30 DYSPEPSIA: Status: ACTIVE | Noted: 2023-01-24

## 2023-01-26 PROBLEM — R10.13 ABDOMINAL PAIN - EPIGASTRIC: Status: ACTIVE | Noted: 2023-01-24

## 2024-07-16 ENCOUNTER — TELEPHONE (OUTPATIENT)
Dept: FAMILY MEDICINE CLINIC | Facility: CLINIC | Age: 49
End: 2024-07-16

## 2024-07-16 ENCOUNTER — OFFICE VISIT (OUTPATIENT)
Dept: FAMILY MEDICINE CLINIC | Facility: CLINIC | Age: 49
End: 2024-07-16
Payer: OTHER GOVERNMENT

## 2024-07-16 VITALS
BODY MASS INDEX: 30.28 KG/M2 | WEIGHT: 211.5 LBS | HEIGHT: 70 IN | SYSTOLIC BLOOD PRESSURE: 138 MMHG | OXYGEN SATURATION: 98 % | DIASTOLIC BLOOD PRESSURE: 84 MMHG | HEART RATE: 64 BPM

## 2024-07-16 DIAGNOSIS — R20.8 DYSESTHESIA OF MULTIPLE SITES: ICD-10-CM

## 2024-07-16 DIAGNOSIS — M25.511 PAIN OF BOTH SHOULDER JOINTS: ICD-10-CM

## 2024-07-16 DIAGNOSIS — R20.2 PARESTHESIA: ICD-10-CM

## 2024-07-16 DIAGNOSIS — M79.10 MYALGIA: ICD-10-CM

## 2024-07-16 DIAGNOSIS — M79.7 FIBROMYALGIA: ICD-10-CM

## 2024-07-16 DIAGNOSIS — M25.512 PAIN OF BOTH SHOULDER JOINTS: ICD-10-CM

## 2024-07-16 DIAGNOSIS — M54.2 NECK PAIN: Primary | ICD-10-CM

## 2024-07-16 PROBLEM — K83.01 PRIMARY SCLEROSING CHOLANGITIS: Status: ACTIVE | Noted: 2024-07-16

## 2024-07-16 PROCEDURE — 99204 OFFICE O/P NEW MOD 45 MIN: CPT | Performed by: INTERNAL MEDICINE

## 2024-07-16 RX ORDER — MULTIPLE VITAMINS W/ MINERALS TAB 9MG-400MCG
1 TAB ORAL DAILY
COMMUNITY

## 2024-07-16 RX ORDER — DULOXETIN HYDROCHLORIDE 30 MG/1
30 CAPSULE, DELAYED RELEASE ORAL DAILY
Qty: 30 CAPSULE | Refills: 1 | Status: SHIPPED | OUTPATIENT
Start: 2024-07-16

## 2024-07-16 RX ORDER — METHOCARBAMOL 500 MG/1
TABLET, FILM COATED ORAL
Qty: 30 TABLET | Refills: 0 | Status: SHIPPED | OUTPATIENT
Start: 2024-07-16

## 2024-07-16 RX ORDER — HYDROXYZINE HYDROCHLORIDE 25 MG/1
25 TABLET, FILM COATED ORAL AS NEEDED
COMMUNITY
Start: 2024-02-13

## 2024-07-16 RX ORDER — THIAMINE HCL 100 MG
TABLET ORAL DAILY
COMMUNITY

## 2024-07-16 NOTE — PROGRESS NOTES
Office Note     Name: Alyssa Somers    : 1975     MRN: [id number]     Chief Complaint  Establish Care and Pain (Pain in arms and neck. Physical therapy has not helped. Patient reports it is interfering with daily life and needs help putting on clothes.)    Subjective     History of Present Illness:  Alyssa Somers is a 49 y.o. female who presents today for:      Previous PCP Dr Wills.  Had Been doing PT through [facility]  Sees  Gastro for PSC  Sees Dr Victor (cardiology) for rapid heart rate and takes bisoprolol.  Asthma: mostly seasonal, usually worse in spring / fall. Was on Trelegy but didn't feel like it helped.  GYN: None, has not had recent annual/mammo    Works as a .    Early  started having burning stabbing pain in the upper right arm. Mostly in the front and outer part of the arm. Has also developed in left arm now but is worse in the upper right arm.  Has also had neck and shoulder pain the past several months. Cannot lift the arms due to severe pain in both arms, cannot shave or wash herself  has to help.  Hurts to lay flat.  Was prescribed muscle relaxer but was upset because it was a very low dose so they did not think it would work (methocarbamal 750 Once a day just on therapy days). Took a leftover oxycodone from her blly surgery but knocked her out and gave her a headache.    Has been doing physical therapy since , has done 16 visits with PT as well as home exercise.  Physical therapy has not been able to help see what's going.    Has not had any arm imaging.    Also feels very tender to the touch, often hurts just to be hugged or touched.    No history of injury trauma or fall.  No illnesses leading up to it.    Has  had inflammatory markers done which were reportedly normal.           Review of Systems:   Review of Systems    Past Medical History:   Past Medical History:   Diagnosis Date    Allergic     Asthma     History of medical problems      PSC(2023)/rapid heart w extra beat(2021)    Tear of meniscus of knee 06/2020    Left knee surgery 6/30/2020 in [place]       Past Surgical History:   Past Surgical History:   Procedure Laterality Date    COLONOSCOPY  2020    FRACTURE SURGERY  2019/2020    Knee surgery    HYSTERECTOMY      HYSTERECTOMY  9/2008    KNEE SURGERY Left 06/30/2020    arthroscopy meniscal tear    TONSILLECTOMY AND ADENOIDECTOMY         Family History:   Family History   Problem Relation Age of Onset    Cancer Mother         Systemic amlyoidosis    Thyroid disease Mother     COPD Father        Social History:   Social History     Socioeconomic History    Marital status:    Tobacco Use    Smoking status: Never     Passive exposure: Never    Smokeless tobacco: Never   Vaping Use    Vaping status: Never Used   Substance and Sexual Activity    Alcohol use: Not Currently    Drug use: Never    Sexual activity: Yes     Partners: Male     Birth control/protection: Hysterectomy       Immunizations:   Immunization History   Administered Date(s) Administered    Tdap 07/01/2021        Medications:     Current Outpatient Medications:     albuterol sulfate  (90 Base) MCG/ACT inhaler, Inhale 2 puffs Every 4 (Four) Hours As Needed for Wheezing., Disp: , Rfl:     bisoprolol (ZEBeta) 5 MG tablet, , Disp: , Rfl:     Calcium Citrate-Vitamin D3 (CITRACAL) 315-6.25 MG-MCG tablet tablet, Take  by mouth Daily., Disp: , Rfl:     hydrOXYzine (ATARAX) 25 MG tablet, Take 1 tablet by mouth As Needed for Itching., Disp: , Rfl:     levocetirizine (XYZAL) 5 MG tablet, Xyzal 5 mg tablet  Take 1 tablet every day by oral route at bedtime., Disp: , Rfl:     multivitamin with minerals (CENTRUM SILVER 50+WOMEN PO), Take 1 tablet by mouth Daily., Disp: , Rfl:     Rimegepant Sulfate (NURTEC PO), Take 75 mg by mouth As Needed., Disp: , Rfl:     celecoxib (CeleBREX) 200 MG capsule, Take 1 capsule by mouth Daily With Breakfast. Take 1 tablet 2 times per day.  "(Patient not taking: Reported on 7/16/2024), Disp: 60 capsule, Rfl: 1      Allergies:   Allergies   Allergen Reactions    Influenza Vaccines Shortness Of Breath and Palpitations     PT HOSPITALIZED DUE TO INFLUENZA VACCINE. PT UNSURE WHICH VACCINE SHE TOOK.     Codeine Swelling     SWELLING IN THROAT   12/27 nurse interviewed pt who stated she has had hydromorphone in the past with no issues    Zithromax [Azithromycin] Swelling     SWELLING IN THROAT       Objective     Vital Signs  /84 (BP Location: Left arm, Patient Position: Sitting, Cuff Size: Adult)   Pulse 64   Ht 177.8 cm (70\")   Wt 95.9 kg (211 lb 8 oz)   SpO2 98%   BMI 30.35 kg/m²   Estimated body mass index is 30.35 kg/m² as calculated from the following:    Height as of this encounter: 177.8 cm (70\").    Weight as of this encounter: 95.9 kg (211 lb 8 oz).          Physical Exam  Vitals and nursing note reviewed.   Constitutional:       Appearance: Normal appearance.   Cardiovascular:      Rate and Rhythm: Normal rate and regular rhythm.      Heart sounds: No murmur heard.     No friction rub. No gallop.   Pulmonary:      Effort: Pulmonary effort is normal.      Breath sounds: Normal breath sounds. No wheezing, rhonchi or rales.   Musculoskeletal:      Comments: Multiple tender.  Trigger points in the shoulders mid back upper extremities and waist.  She also has significant dysesthesia expresses pain at light touch and non-painful stimuli   Neurological:      Mental Status: She is alert.          Procedures     Assessment and Plan     Diagnoses:    ICD-10-CM ICD-9-CM   1. Neck pain  M54.2 723.1   2. Pain of both shoulder joints  M25.511 719.41    M25.512    3. Myalgia  M79.10 729.1   4. Fibromyalgia  M79.7 729.1   5. Paresthesia  R20.2 782.0   6. Dysesthesia of multiple sites  R20.8 782.0       Plan:  1. Neck pain    - CBC & Differential  - Comprehensive Metabolic Panel  - TSH Rfx On Abnormal To Free T4  - TORO by IFA, Reflex to Titer and " Pattern  - Rheumatoid Factor  - CK  - C-reactive Protein  - Sedimentation Rate  - Vitamin B12  - Folate  - XR Shoulder 2+ View Bilateral (In Office)  - XR Spine Cervical 2 or 3 View    2. Pain of both shoulder joints    - CBC & Differential  - Comprehensive Metabolic Panel  - TSH Rfx On Abnormal To Free T4  - TORO by IFA, Reflex to Titer and Pattern  - Rheumatoid Factor  - CK  - C-reactive Protein  - Sedimentation Rate  - Vitamin B12  - Folate  - XR Shoulder 2+ View Bilateral (In Office)  - XR Spine Cervical 2 or 3 View    3. Myalgia    - CBC & Differential  - Comprehensive Metabolic Panel  - TSH Rfx On Abnormal To Free T4  - TORO by IFA, Reflex to Titer and Pattern  - Rheumatoid Factor  - CK  - C-reactive Protein  - Sedimentation Rate  - Vitamin B12  - Folate  - XR Shoulder 2+ View Bilateral (In Office)  - XR Spine Cervical 2 or 3 View    4. Fibromyalgia    - CBC & Differential  - Comprehensive Metabolic Panel  - TSH Rfx On Abnormal To Free T4  - TORO by IFA, Reflex to Titer and Pattern  - Rheumatoid Factor  - CK  - C-reactive Protein  - Sedimentation Rate  - Vitamin B12  - Folate  - XR Shoulder 2+ View Bilateral (In Office)  - XR Spine Cervical 2 or 3 View  - DULoxetine (CYMBALTA) 30 MG capsule; Take 1 capsule by mouth Daily.  Dispense: 30 capsule; Refill: 1    5. Paresthesia    - CBC & Differential  - Comprehensive Metabolic Panel  - TSH Rfx On Abnormal To Free T4  - TORO by IFA, Reflex to Titer and Pattern  - Rheumatoid Factor  - CK  - C-reactive Protein  - Sedimentation Rate  - Vitamin B12  - Folate  - XR Shoulder 2+ View Bilateral (In Office)  - XR Spine Cervical 2 or 3 View    6. Dysesthesia of multiple sites         Follow Up  Return in about 4 weeks (around 8/13/2024).    Patient was advised to call the office or seek medical care if  any issues discussed during this visit worsen or persist or if new concerns arise        MD ROCAEL Singh [facility]  [address] [facility] [id number] [phone]

## 2024-07-17 LAB
ALBUMIN SERPL-MCNC: 4.6 G/DL (ref 3.8–4.9)
ALP SERPL-CCNC: 55 IU/L (ref 44–121)
ALT SERPL-CCNC: 22 IU/L (ref 0–32)
ANA SER QL IF: NEGATIVE
AST SERPL-CCNC: 30 IU/L (ref 0–40)
BASOPHILS # BLD AUTO: 0 X10E3/UL (ref 0–0.2)
BASOPHILS NFR BLD AUTO: 0 %
BILIRUB SERPL-MCNC: 0.4 MG/DL (ref 0–1.2)
BUN SERPL-MCNC: 9 MG/DL (ref 6–24)
BUN/CREAT SERPL: 11 (ref 9–23)
CALCIUM SERPL-MCNC: 10.4 MG/DL (ref 8.7–10.2)
CHLORIDE SERPL-SCNC: 103 MMOL/L (ref 96–106)
CK SERPL-CCNC: 67 U/L (ref 32–182)
CO2 SERPL-SCNC: 26 MMOL/L (ref 20–29)
CREAT SERPL-MCNC: 0.82 MG/DL (ref 0.57–1)
CRP SERPL-MCNC: 4 MG/L (ref 0–10)
EGFRCR SERPLBLD CKD-EPI 2021: 87 ML/MIN/1.73
EOSINOPHIL # BLD AUTO: 0.1 X10E3/UL (ref 0–0.4)
EOSINOPHIL NFR BLD AUTO: 2 %
ERYTHROCYTE [DISTWIDTH] IN BLOOD BY AUTOMATED COUNT: 12.2 % (ref 11.7–15.4)
ERYTHROCYTE [SEDIMENTATION RATE] IN BLOOD BY WESTERGREN METHOD: 8 MM/HR (ref 0–40)
FOLATE SERPL-MCNC: >20 NG/ML
GLOBULIN SER CALC-MCNC: 2.4 G/DL (ref 1.5–4.5)
GLUCOSE SERPL-MCNC: 89 MG/DL (ref 70–99)
HCT VFR BLD AUTO: 41.1 % (ref 34–46.6)
HGB BLD-MCNC: 13.8 G/DL (ref 11.1–15.9)
IMM GRANULOCYTES # BLD AUTO: 0 X10E3/UL (ref 0–0.1)
IMM GRANULOCYTES NFR BLD AUTO: 0 %
LYMPHOCYTES # BLD AUTO: 1.9 X10E3/UL (ref 0.7–3.1)
LYMPHOCYTES NFR BLD AUTO: 34 %
MCH RBC QN AUTO: 32.1 PG (ref 26.6–33)
MCHC RBC AUTO-ENTMCNC: 33.6 G/DL (ref 31.5–35.7)
MCV RBC AUTO: 96 FL (ref 79–97)
MONOCYTES # BLD AUTO: 0.5 X10E3/UL (ref 0.1–0.9)
MONOCYTES NFR BLD AUTO: 9 %
NEUTROPHILS # BLD AUTO: 3 X10E3/UL (ref 1.4–7)
NEUTROPHILS NFR BLD AUTO: 55 %
PLATELET # BLD AUTO: 220 X10E3/UL (ref 150–450)
POTASSIUM SERPL-SCNC: 4.8 MMOL/L (ref 3.5–5.2)
PROT SERPL-MCNC: 7 G/DL (ref 6–8.5)
RBC # BLD AUTO: 4.3 X10E6/UL (ref 3.77–5.28)
RHEUMATOID FACT SERPL-ACNC: <10 IU/ML
SODIUM SERPL-SCNC: 140 MMOL/L (ref 134–144)
TSH SERPL DL<=0.005 MIU/L-ACNC: 4.22 UIU/ML (ref 0.45–4.5)
VIT B12 SERPL-MCNC: 762 PG/ML (ref 232–1245)
WBC # BLD AUTO: 5.5 X10E3/UL (ref 3.4–10.8)

## 2024-07-22 ENCOUNTER — TELEPHONE (OUTPATIENT)
Dept: FAMILY MEDICINE CLINIC | Facility: CLINIC | Age: 49
End: 2024-07-22

## 2024-07-22 NOTE — TELEPHONE ENCOUNTER
Caller: Alyssa Somers    Relationship: Self    Best call back number: [phone]     Which medication are you concerned about: DULoxetine (CYMBALTA) 30 MG capsule     Who prescribed you this medication: DR MAYO     When did you start taking this medication: 07/19/2024    What are your concerns: THE PATIENT REPORTS SHE IS EXPERIENCING A REDUCED URINE FLOW SINCE TAKING THE MEDICATION.     How long have you had these concerns: SINCE 07/21/2024    THE PATIENT IS REQUESTING A CALL BACK TO DISCUSS, PLEASE CALL.

## 2024-07-23 NOTE — TELEPHONE ENCOUNTER
There have been  occasiaonally cases of this causing people to retain urine but it is not common and is usually mild.  If her symptoms are mild I think it's ok toe keep taking it for now to see if she acclimates, if it is too severe she can stop taking it and we will discuss other treatment options when she comes back for her next appointment in August

## 2024-08-19 ENCOUNTER — OFFICE VISIT (OUTPATIENT)
Dept: FAMILY MEDICINE CLINIC | Facility: CLINIC | Age: 49
End: 2024-08-19
Payer: OTHER GOVERNMENT

## 2024-08-19 VITALS
BODY MASS INDEX: 30.49 KG/M2 | SYSTOLIC BLOOD PRESSURE: 128 MMHG | WEIGHT: 213 LBS | DIASTOLIC BLOOD PRESSURE: 84 MMHG | HEIGHT: 70 IN | HEART RATE: 78 BPM | OXYGEN SATURATION: 96 %

## 2024-08-19 DIAGNOSIS — E83.52 HYPERCALCEMIA: Primary | ICD-10-CM

## 2024-08-19 DIAGNOSIS — M79.7 FIBROMYALGIA: ICD-10-CM

## 2024-08-19 PROCEDURE — 99213 OFFICE O/P EST LOW 20 MIN: CPT | Performed by: INTERNAL MEDICINE

## 2024-08-19 RX ORDER — DULOXETIN HYDROCHLORIDE 30 MG/1
30 CAPSULE, DELAYED RELEASE ORAL DAILY
Qty: 30 CAPSULE | Refills: 1 | Status: SHIPPED | OUTPATIENT
Start: 2024-08-19

## 2024-08-19 NOTE — PROGRESS NOTES
Office Note     Name: Alyssa Somers    : 1975     MRN: [id number]     Chief Complaint  Med Refill (Pt is here for a medication recheck today. )    Subjective     History of Present Illness:  Alyssa Somers is a 49 y.o. female who pres  ents today for:      Fibromyalgia: recent diagnosis: started cymbalta last visit once a day which is working ok, still has pain but has lightning bolt pain is some better.    GI/hepatology: for PSC: saw them  since last visit, follows up every 4-6 months with imaging every 6 months, no significant changes in recommendations at this time    Nutritionist has recommended calcium supplement 2 pills twice a day but when levels came back high so she dropped down to 1 every morning and  every evening, since last labs were done.        Review of Systems:   Review of Systems    Past Medical History:   Past Medical History:   Diagnosis Date    Allergic     Asthma     History of medical problems     PSC()/rapid heart w extra beat()    Tear of meniscus of knee 2020    Left knee surgery 2020 in [place]       Past Surgical History:   Past Surgical History:   Procedure Laterality Date    COLONOSCOPY      FRACTURE SURGERY      Knee surgery    HYSTERECTOMY      HYSTERECTOMY  2008    KNEE SURGERY Left 2020    arthroscopy meniscal tear    TONSILLECTOMY AND ADENOIDECTOMY         Family History:   Family History   Problem Relation Age of Onset    Cancer Mother         Systemic amlyoidosis    Thyroid disease Mother     COPD Father        Social History:   Social History     Socioeconomic History    Marital status:    Tobacco Use    Smoking status: Never     Passive exposure: Never    Smokeless tobacco: Never   Vaping Use    Vaping status: Never Used   Substance and Sexual Activity    Alcohol use: Not Currently    Drug use: Never    Sexual activity: Yes     Partners: Male     Birth control/protection: Hysterectomy       Immunizations:  "  Immunization History   Administered Date(s) Administered    Tdap 07/01/2021        Medications:     Current Outpatient Medications:     albuterol sulfate  (90 Base) MCG/ACT inhaler, Inhale 2 puffs Every 4 (Four) Hours As Needed for Wheezing., Disp: , Rfl:     bisoprolol (ZEBeta) 5 MG tablet, Take 1 tablet by mouth Daily., Disp: , Rfl:     Calcium Citrate-Vitamin D3 (CITRACAL) 315-6.25 MG-MCG tablet tablet, Take  by mouth Daily., Disp: , Rfl:     DULoxetine (CYMBALTA) 30 MG capsule, Take 1 capsule by mouth Daily., Disp: 30 capsule, Rfl: 1    hydrOXYzine (ATARAX) 25 MG tablet, Take 1 tablet by mouth As Needed for Itching., Disp: , Rfl:     levocetirizine (XYZAL) 5 MG tablet, Xyzal 5 mg tablet  Take 1 tablet every day by oral route at bedtime., Disp: , Rfl:     methocarbamol (ROBAXIN) 500 MG tablet, Three times a day as needed for 5 days then nightly at bedtime, Disp: 30 tablet, Rfl: 0    multivitamin with minerals (CENTRUM SILVER 50+WOMEN PO), Take 1 tablet by mouth Daily., Disp: , Rfl:     Rimegepant Sulfate (NURTEC PO), Take 75 mg by mouth As Needed., Disp: , Rfl:     Allergies:   Allergies   Allergen Reactions    Influenza Vaccines Shortness Of Breath and Palpitations     PT HOSPITALIZED DUE TO INFLUENZA VACCINE. PT UNSURE WHICH VACCINE SHE TOOK.     Codeine Swelling     SWELLING IN THROAT   12/27 nurse interviewed pt who stated she has had hydromorphone in the past with no issues    Zithromax [Azithromycin] Swelling     SWELLING IN THROAT       Objective     Vital Signs  /84   Pulse 78   Ht 177.8 cm (70\")   Wt 96.6 kg (213 lb)   SpO2 96%   BMI 30.56 kg/m²   Estimated body mass index is 30.56 kg/m² as calculated from the following:    Height as of this encounter: 177.8 cm (70\").    Weight as of this encounter: 96.6 kg (213 lb).          Physical Exam  Vitals and nursing note reviewed.   Constitutional:       Appearance: Normal appearance.   Cardiovascular:      Rate and Rhythm: Normal rate and " regular rhythm.      Heart sounds: No murmur heard.     No friction rub. No gallop.   Pulmonary:      Effort: Pulmonary effort is normal.      Breath sounds: Normal breath sounds. No wheezing, rhonchi or rales.   Neurological:      Mental Status: She is alert.            Procedures     Assessment and Plan     Diagnoses:    ICD-10-CM ICD-9-CM   1. Hypercalcemia  E83.52 275.42   2. Fibromyalgia  M79.7 729.1       Plan:  1. Hypercalcemia    - Basic Metabolic Panel    2. Fibromyalgia    - DULoxetine (CYMBALTA) 30 MG capsule; Take 1 capsule by mouth Daily.  Dispense: 30 capsule; Refill: 1       Follow Up  Return in about 6 weeks (around 9/30/2024).    Patient was advised to call the office or seek medical care if  any issues discussed during this visit worsen or persist or if new concerns arise        MD JAZZMINE [facility]  [address] [place] [id number] [phone]  Answers submitted by the patient for this visit:  Other (Submitted on 8/12/2024)  Please describe your symptoms.: Follow up appt  Have you had these symptoms before?: Yes  How long have you been having these symptoms?: Greater than 2 weeks  Primary Reason for Visit (Submitted on 8/12/2024)  What is the primary reason for your visit?: Other

## 2024-09-16 ENCOUNTER — OFFICE VISIT (OUTPATIENT)
Dept: FAMILY MEDICINE CLINIC | Facility: CLINIC | Age: 49
End: 2024-09-16
Payer: OTHER GOVERNMENT

## 2024-09-16 VITALS
BODY MASS INDEX: 30.35 KG/M2 | SYSTOLIC BLOOD PRESSURE: 114 MMHG | OXYGEN SATURATION: 96 % | HEART RATE: 73 BPM | WEIGHT: 212 LBS | HEIGHT: 70 IN | DIASTOLIC BLOOD PRESSURE: 78 MMHG

## 2024-09-16 DIAGNOSIS — M54.2 NECK PAIN: ICD-10-CM

## 2024-09-16 DIAGNOSIS — M79.601 ARM PAIN, ANTERIOR, RIGHT: ICD-10-CM

## 2024-09-16 DIAGNOSIS — R20.2 PARESTHESIA: Primary | ICD-10-CM

## 2024-09-16 DIAGNOSIS — M79.7 FIBROMYALGIA: ICD-10-CM

## 2024-09-16 PROCEDURE — 99213 OFFICE O/P EST LOW 20 MIN: CPT | Performed by: INTERNAL MEDICINE

## 2024-10-04 ENCOUNTER — HOSPITAL ENCOUNTER (OUTPATIENT)
Dept: MRI IMAGING | Facility: HOSPITAL | Age: 49
Discharge: HOME | End: 2024-10-04
Admitting: INTERNAL MEDICINE
Payer: OTHER GOVERNMENT

## 2024-10-04 DIAGNOSIS — R20.2 PARESTHESIA: ICD-10-CM

## 2024-10-04 DIAGNOSIS — M54.2 NECK PAIN: ICD-10-CM

## 2024-10-04 DIAGNOSIS — M79.601 ARM PAIN, ANTERIOR, RIGHT: ICD-10-CM

## 2024-10-04 PROCEDURE — 72141 MRI NECK SPINE W/O DYE: CPT

## 2024-10-08 ENCOUNTER — OFFICE VISIT (OUTPATIENT)
Dept: FAMILY MEDICINE CLINIC | Facility: CLINIC | Age: 49
End: 2024-10-08
Payer: OTHER GOVERNMENT

## 2024-10-08 VITALS
BODY MASS INDEX: 30.35 KG/M2 | WEIGHT: 212 LBS | HEIGHT: 70 IN | SYSTOLIC BLOOD PRESSURE: 142 MMHG | HEART RATE: 68 BPM | DIASTOLIC BLOOD PRESSURE: 96 MMHG | OXYGEN SATURATION: 95 %

## 2024-10-08 DIAGNOSIS — M25.511 ACUTE PAIN OF RIGHT SHOULDER: ICD-10-CM

## 2024-10-08 DIAGNOSIS — R20.2 PARESTHESIA: Primary | ICD-10-CM

## 2024-10-08 DIAGNOSIS — M79.601 ARM PAIN, ANTERIOR, RIGHT: ICD-10-CM

## 2024-10-08 DIAGNOSIS — M25.511 PAIN OF BOTH SHOULDER JOINTS: ICD-10-CM

## 2024-10-08 DIAGNOSIS — M25.512 PAIN OF BOTH SHOULDER JOINTS: ICD-10-CM

## 2024-10-08 PROCEDURE — 99214 OFFICE O/P EST MOD 30 MIN: CPT | Performed by: INTERNAL MEDICINE

## 2024-10-08 NOTE — PROGRESS NOTES
Office Note     Name: Alyssa Somers    : 1975     MRN: [id number]     Chief Complaint  discuss mri results  (Pt is here to discuss test results. )    Subjective     History of Present Illness:  Alyssa Somers is a 49 y.o. female who presents today for:    History of Present Illness  The patient is a 49-year-old female who presents for a follow-up on MRI results and general symptoms. She is accompanied by an adult male.    She reports experiencing pain, with the right shoulder being more affected than the left. She has not undergone any imaging of her right shoulder in recent years and expresses a desire to understand the cause of her pain. She requests imaging of her shoulders to investigate the source of the burning sensation and tingling. She recalls waking up one morning in pain and mentions that she can hear a crinkling sound when she turns her neck.    She underwent physical therapy twice a week for four months, specifically targeting her neck, shoulders, and arm. She also performed exercises at home. She is right-handed and prefers to sleep on her side, but finds it too painful. She has tried acupuncture and massage therapy as part of her pain management strategy. She has a lump in her arm and has previously received cortisone injections, which she found unhelpful.    She is currently taking Cymbalta for fibromyalgia and is considering discontinuing it.      Review of Systems:   Review of Systems    Past Medical History:   Past Medical History:   Diagnosis Date    Allergic     Asthma     History of medical problems     PSC()/rapid heart w extra beat()    Tear of meniscus of knee 2020    Left knee surgery 2020 in [place]       Past Surgical History:   Past Surgical History:   Procedure Laterality Date    COLONOSCOPY      FRACTURE SURGERY      Knee surgery    HYSTERECTOMY      HYSTERECTOMY  2008    KNEE SURGERY Left 2020     arthroscopy meniscal tear    TONSILLECTOMY AND ADENOIDECTOMY         Family History:   Family History   Problem Relation Age of Onset    Cancer Mother         Systemic amlyoidosis    Thyroid disease Mother     COPD Father        Social History:   Social History     Socioeconomic History    Marital status:    Tobacco Use    Smoking status: Never     Passive exposure: Never    Smokeless tobacco: Never   Vaping Use    Vaping status: Never Used   Substance and Sexual Activity    Alcohol use: Not Currently    Drug use: Never    Sexual activity: Yes     Partners: Male     Birth control/protection: Hysterectomy       Immunizations:   Immunization History   Administered Date(s) Administered    Tdap 07/01/2021        Medications:     Current Outpatient Medications:     albuterol sulfate  (90 Base) MCG/ACT inhaler, Inhale 2 puffs Every 4 (Four) Hours As Needed for Wheezing., Disp: , Rfl:     bisoprolol (ZEBeta) 5 MG tablet, Take 1 tablet by mouth Daily., Disp: , Rfl:     Calcium Citrate-Vitamin D3 (CITRACAL) 315-6.25 MG-MCG tablet tablet, Take  by mouth Daily., Disp: , Rfl:     DULoxetine (CYMBALTA) 30 MG capsule, Take 1 capsule by mouth Daily., Disp: 30 capsule, Rfl: 1    hydrOXYzine (ATARAX) 25 MG tablet, Take 1 tablet by mouth As Needed for Itching., Disp: , Rfl:     levocetirizine (XYZAL) 5 MG tablet, Xyzal 5 mg tablet  Take 1 tablet every day by oral route at bedtime., Disp: , Rfl:     methocarbamol (ROBAXIN) 500 MG tablet, Three times a day as needed for 5 days then nightly at bedtime, Disp: 30 tablet, Rfl: 0    multivitamin with minerals (CENTRUM SILVER 50+WOMEN PO), Take 1 tablet by mouth Daily., Disp: , Rfl:     Rimegepant Sulfate (NURTEC PO), Take 75 mg by mouth As Needed., Disp: , Rfl:     Allergies:   Allergies   Allergen Reactions    Influenza Vaccines Shortness Of Breath and Palpitations     PT HOSPITALIZED DUE TO INFLUENZA VACCINE. PT UNSURE WHICH VACCINE SHE TOOK.     Codeine Swelling      "SWELLING IN THROAT   12/27 nurse interviewed pt who stated she has had hydromorphone in the past with no issues    Zithromax [Azithromycin] Swelling     SWELLING IN THROAT       Objective     Vital Signs  /96   Pulse 68   Ht 177.8 cm (70\")   Wt 96.2 kg (212 lb)   SpO2 95%   BMI 30.42 kg/m²   Estimated body mass index is 30.42 kg/m² as calculated from the following:    Height as of this encounter: 177.8 cm (70\").    Weight as of this encounter: 96.2 kg (212 lb).    Vital Signs were reviewed.    BMI is >= 30 and <35. (Class 1 Obesity). The following options were offered after discussion;: weight loss educational material (shared in after visit summary)      Physical Exam  Vitals and nursing note reviewed.   Constitutional:       Appearance: Normal appearance.   Cardiovascular:      Rate and Rhythm: Normal rate and regular rhythm.      Heart sounds: No murmur heard.     No friction rub. No gallop.   Pulmonary:      Effort: Pulmonary effort is normal.      Breath sounds: Normal breath sounds. No wheezing, rhonchi or rales.   Neurological:      Mental Status: She is alert.      Comments: ROM shoulders limited secondary to pain        Physical Exam         Results  Imaging  MRI showed a small disc bulge without significant spinal canal or neural foraminal narrowing.    Procedures     Assessment and Plan     Diagnoses:    ICD-10-CM ICD-9-CM   1. Paresthesia  R20.2 782.0   2. Arm pain, anterior, right  M79.601 729.5   3. Pain of both shoulder joints  M25.511 719.41    M25.512    4. Acute pain of right shoulder  M25.511 719.41       Plan:    1. Paresthesia    - MRI Shoulder Right Without Contrast; Future    2. Arm pain, anterior, right    - MRI Shoulder Right Without Contrast; Future    3. Pain of both shoulder joints    - MRI Shoulder Right Without Contrast; Future    4. Acute pain of right shoulder    - MRI Shoulder Right Without Contrast; Future       Assessment & Plan  1. Shoulder pain.  The recent MRI C spine " results were reviewed with patietn and  today in the office. showed a minor disc bulge without causing any narrowing or nerve impingement. The absence of a pinched nerve in her neck was confirmed. The possibility of fibromyalgia or muscle-related issues was discussed. X-rays indicated arthritis. An MRI of the right shoulder will be conducted at [address]. If the MRI results are normal, an EMG and pain management will be considered. If the results indicate tendinitis or a significant tear in the shoulder, a referral to orthopedics will be made instead of pain management. She was advised to continue her exercise regimen and maintain her Cymbalta medication.    2. Fibromyalgia.  She is currently on Cymbalta for fibromyalgia. The medication appears to be effective in managing her symptoms. She was advised to continue taking Cymbalta. If she wishes to discontinue the medication, she can try taking it every other day for a couple of weeks before stopping completely to monitor for any worsening of symptoms.           Follow Up  No follow-ups on file.    Patient was advised to call the office or seek medical care if  any issues discussed during this visit worsen or persist or if new concerns arise    Patient or patient representative verbalized consent for the use of Ambient Listening during the visit with  Ana Hernandez MD for chart documentation. 10/15/2024  08:12 EDT    Ana Hernandez MD  [facility]  [address] [place] [id number]  [phone]  Answers submitted by the patient for this visit:  Other (Submitted on 10/4/2024)  Please describe your symptoms.: Follow up appt to talk about MRI results and next steps  Have you had these symptoms before?: Yes  How long have you been having these symptoms?: Greater than 2 weeks  Primary Reason for Visit (Submitted on 10/4/2024)  What is the primary reason for your visit?:  Problem Not Listed

## 2024-10-24 ENCOUNTER — HOSPITAL ENCOUNTER (OUTPATIENT)
Dept: MRI IMAGING | Facility: HOSPITAL | Age: 49
Discharge: HOME | End: 2024-10-24
Admitting: INTERNAL MEDICINE
Payer: OTHER GOVERNMENT

## 2024-10-24 DIAGNOSIS — M25.511 ACUTE PAIN OF RIGHT SHOULDER: ICD-10-CM

## 2024-10-24 DIAGNOSIS — M25.512 PAIN OF BOTH SHOULDER JOINTS: ICD-10-CM

## 2024-10-24 DIAGNOSIS — R20.2 PARESTHESIA: ICD-10-CM

## 2024-10-24 DIAGNOSIS — M79.601 ARM PAIN, ANTERIOR, RIGHT: ICD-10-CM

## 2024-10-24 DIAGNOSIS — M25.511 PAIN OF BOTH SHOULDER JOINTS: ICD-10-CM

## 2024-10-24 PROCEDURE — 73221 MRI JOINT UPR EXTREM W/O DYE: CPT

## 2024-10-28 DIAGNOSIS — M75.101 TEAR OF RIGHT SUPRASPINATUS TENDON: Primary | ICD-10-CM

## 2024-11-01 ENCOUNTER — OFFICE VISIT (OUTPATIENT)
Dept: FAMILY MEDICINE CLINIC | Facility: CLINIC | Age: 49
End: 2024-11-01
Payer: OTHER GOVERNMENT

## 2024-11-01 VITALS
DIASTOLIC BLOOD PRESSURE: 68 MMHG | HEIGHT: 70 IN | OXYGEN SATURATION: 97 % | SYSTOLIC BLOOD PRESSURE: 114 MMHG | BODY MASS INDEX: 31.48 KG/M2 | WEIGHT: 219.9 LBS | HEART RATE: 75 BPM

## 2024-11-01 DIAGNOSIS — M79.7 FIBROMYALGIA: ICD-10-CM

## 2024-11-01 DIAGNOSIS — K83.01 PRIMARY SCLEROSING CHOLANGITIS: Primary | ICD-10-CM

## 2024-11-01 PROCEDURE — 99213 OFFICE O/P EST LOW 20 MIN: CPT | Performed by: INTERNAL MEDICINE

## 2024-11-01 RX ORDER — BISOPROLOL FUMARATE 5 MG/1
5 TABLET, FILM COATED ORAL DAILY
Qty: 30 TABLET | Refills: 3 | Status: SHIPPED | OUTPATIENT
Start: 2024-11-01

## 2024-11-01 RX ORDER — DULOXETIN HYDROCHLORIDE 30 MG/1
30 CAPSULE, DELAYED RELEASE ORAL DAILY
Qty: 30 CAPSULE | Refills: 3 | Status: SHIPPED | OUTPATIENT
Start: 2024-11-01

## 2024-11-01 NOTE — PROGRESS NOTES
Office Note     Name: Alyssa Somers    : 1975     MRN: [id number]     Chief Complaint  Med Refill (Pt is here for a medication recheck today. ) and Discuss MRI  (Pt is here for a follow up on MRI today. )    Subjective     History of Present Illness:  Alyssa Somers is a 49 y.o. female who presents today for:    History of Present Illness  The patient is a 49-year-old female here today to follow up on a recent MRI as well as chronic pain from fibromyalgia.    She has been informed of a minor tear in one of her ligaments, as indicated by her MRI results. She is currently awaiting a referral for further evaluation. She continues to experience pain in both arms, her neck, and shoulders. Some days, she wakes up feeling pain throughout her body and experiences extreme fatigue. Her sleep is often disrupted due to the pain.    She has expressed concerns about the potential impact of cortisone injections on her liver, given that one-third of her liver is non-functional. She is hesitant to take any medication unless absolutely necessary. She is scheduled for a scan in 2024 and has two upcoming appointments with her liver doctor.    She is considering scheduling a mammogram and her annual gynecologist appointment once her pain management improves.      Review of Systems:   Review of Systems    Past Medical History:   Past Medical History:   Diagnosis Date    Allergic     Asthma     History of medical problems     PSC()/rapid heart w extra beat()    Tear of meniscus of knee 2020    Left knee surgery 2020 in [place]       Past Surgical History:   Past Surgical History:   Procedure Laterality Date    COLONOSCOPY      FRACTURE SURGERY      Knee surgery    HYSTERECTOMY      HYSTERECTOMY  2008    KNEE SURGERY Left 2020    arthroscopy meniscal tear    TONSILLECTOMY AND ADENOIDECTOMY         Family History:   Family History   Problem Relation Age  of Onset    Cancer Mother         Systemic amlyoidosis    Thyroid disease Mother     COPD Father        Social History:   Social History     Socioeconomic History    Marital status:    Tobacco Use    Smoking status: Never     Passive exposure: Never    Smokeless tobacco: Never   Vaping Use    Vaping status: Never Used   Substance and Sexual Activity    Alcohol use: Not Currently    Drug use: Never    Sexual activity: Yes     Partners: Male     Birth control/protection: Hysterectomy       Immunizations:   Immunization History   Administered Date(s) Administered    Tdap 07/01/2021        Medications:     Current Outpatient Medications:     albuterol sulfate  (90 Base) MCG/ACT inhaler, Inhale 2 puffs Every 4 (Four) Hours As Needed for Wheezing., Disp: , Rfl:     bisoprolol (ZEBeta) 5 MG tablet, Take 1 tablet by mouth Daily., Disp: 30 tablet, Rfl: 3    Calcium Citrate-Vitamin D3 (CITRACAL) 315-6.25 MG-MCG tablet tablet, Take  by mouth Daily., Disp: , Rfl:     DULoxetine (CYMBALTA) 30 MG capsule, Take 1 capsule by mouth Daily., Disp: 30 capsule, Rfl: 3    hydrOXYzine (ATARAX) 25 MG tablet, Take 1 tablet by mouth As Needed for Itching., Disp: , Rfl:     levocetirizine (XYZAL) 5 MG tablet, Xyzal 5 mg tablet  Take 1 tablet every day by oral route at bedtime., Disp: , Rfl:     methocarbamol (ROBAXIN) 500 MG tablet, Three times a day as needed for 5 days then nightly at bedtime, Disp: 30 tablet, Rfl: 0    multivitamin with minerals (CENTRUM SILVER 50+WOMEN PO), Take 1 tablet by mouth Daily., Disp: , Rfl:     Rimegepant Sulfate (NURTEC PO), Take 75 mg by mouth As Needed., Disp: , Rfl:     Allergies:   Allergies   Allergen Reactions    Influenza Vaccines Shortness Of Breath and Palpitations     PT HOSPITALIZED DUE TO INFLUENZA VACCINE. PT UNSURE WHICH VACCINE SHE TOOK.     Codeine Swelling     SWELLING IN THROAT   12/27 nurse interviewed pt who stated she has had hydromorphone in the past with no issues     "Zithromax [Azithromycin] Swelling     SWELLING IN THROAT       Objective     Vital Signs  /68   Pulse 75   Ht 177.8 cm (70\")   Wt 99.7 kg (219 lb 14.4 oz)   SpO2 97%   BMI 31.55 kg/m²   Estimated body mass index is 31.55 kg/m² as calculated from the following:    Height as of this encounter: 177.8 cm (70\").    Weight as of this encounter: 99.7 kg (219 lb 14.4 oz).    Vital Signs were reviewed.            Physical Exam  Vitals and nursing note reviewed.   Constitutional:       General: She is not in acute distress.     Appearance: Normal appearance.   Neurological:      Mental Status: She is alert.        Physical Exam         Results  Imaging  MRI shows a small tear in one of the ligaments in the shoulder.    Procedures     Assessment and Plan     Diagnoses:    ICD-10-CM ICD-9-CM   1. Primary sclerosing cholangitis  K83.01 576.1   2. Fibromyalgia  M79.7 729.1       Plan:    1. Primary sclerosing cholangitis    - Comprehensive Metabolic Panel  - Comprehensive Metabolic Panel    2. Fibromyalgia    - DULoxetine (CYMBALTA) 30 MG capsule; Take 1 capsule by mouth Daily.  Dispense: 30 capsule; Refill: 3       Assessment & Plan  1. Chronic Pain.  The chronic pain is likely due to a small tear in the shoulder ligament as suggested by the radiologist. The possibility of arthritis in the shoulder was also discussed. A referral to an orthopedic specialist was recommended for further evaluation of the shoulder ligament tear. She was advised to consult with her liver specialist regarding the potential impact of cortisone injections on her liver. If the orthopedic specialist recommends physical therapy, it will be tailored to her specific condition.     2. Fibromyalgia.  The unpredictable nature of fibromyalgia symptoms was discussed. She reports waking up with pain all over and extreme fatigue on some days. She is currently on Cymbalta and is advised to continue with the minimal dose possible due to history of liver " disease PSC. Will stay on 30 MG daily dosing for now.  Symptoms are not currently well controlled but will continue same dose for now as long as she is doing okay with it to avoid hepatotoxicity. Will check liver enzymes today    3. Health Maintenance.  A mammogram and gynecological examination will be scheduled at her convenience, she wants to wait until upcoming tests are doen to get these scheduled. She will come back.     4. Medication Management.  Liver enzymes will be checked today to monitor the impact of Cymbalta.           Follow Up  No follow-ups on file.    Patient was advised to call the office or seek medical care if  any issues discussed during this visit worsen or persist or if new concerns arise    Patient or patient representative verbalized consent for the use of Ambient Listening during the visit with  Ana Hernandez MD for chart documentation. 11/18/2024  11:47 EST    Ana Hernandez MD  [facility]  [address] [place] [id number] [phone]  Answers submitted by the patient for this visit:  Office Visit on 11/1/2024  1:45 PM with Ana Hernandez  Other (Submitted on 10/25/2024)  Please describe your symptoms.: Follow up to get results from MRI  Have you had these symptoms before?: Yes  How long have you been having these symptoms?: Greater than 2 weeks  Primary Reason for Visit (Submitted on 10/25/2024)  What is the primary reason for your visit?: Problem Not Listed

## 2024-11-02 LAB
ALBUMIN SERPL-MCNC: 3.9 G/DL (ref 3.8–4.9)
ALP SERPL-CCNC: 59 IU/L (ref 44–121)
ALT SERPL-CCNC: 36 IU/L (ref 0–32)
AST SERPL-CCNC: 31 IU/L (ref 0–40)
BILIRUB SERPL-MCNC: 0.3 MG/DL (ref 0–1.2)
BUN SERPL-MCNC: 13 MG/DL (ref 6–24)
BUN/CREAT SERPL: 18 (ref 9–23)
CALCIUM SERPL-MCNC: 9 MG/DL (ref 8.7–10.2)
CHLORIDE SERPL-SCNC: 102 MMOL/L (ref 96–106)
CO2 SERPL-SCNC: 28 MMOL/L (ref 20–29)
CREAT SERPL-MCNC: 0.73 MG/DL (ref 0.57–1)
EGFRCR SERPLBLD CKD-EPI 2021: 100 ML/MIN/1.73
GLOBULIN SER CALC-MCNC: 2.2 G/DL (ref 1.5–4.5)
GLUCOSE SERPL-MCNC: 76 MG/DL (ref 70–99)
POTASSIUM SERPL-SCNC: 4.1 MMOL/L (ref 3.5–5.2)
PROT SERPL-MCNC: 6.1 G/DL (ref 6–8.5)
SODIUM SERPL-SCNC: 137 MMOL/L (ref 134–144)

## 2024-11-19 ENCOUNTER — PATIENT MESSAGE (OUTPATIENT)
Dept: FAMILY MEDICINE CLINIC | Facility: CLINIC | Age: 49
End: 2024-11-19
Payer: OTHER GOVERNMENT

## 2024-11-19 ENCOUNTER — OFFICE VISIT (OUTPATIENT)
Dept: ORTHOPEDIC SURGERY | Facility: CLINIC | Age: 49
End: 2024-11-19
Payer: OTHER GOVERNMENT

## 2024-11-19 VITALS
DIASTOLIC BLOOD PRESSURE: 82 MMHG | HEIGHT: 70 IN | BODY MASS INDEX: 31.47 KG/M2 | SYSTOLIC BLOOD PRESSURE: 134 MMHG | WEIGHT: 219.8 LBS

## 2024-11-19 DIAGNOSIS — M75.111 INCOMPLETE TEAR OF RIGHT ROTATOR CUFF, UNSPECIFIED WHETHER TRAUMATIC: ICD-10-CM

## 2024-11-19 DIAGNOSIS — M25.511 RIGHT SHOULDER PAIN, UNSPECIFIED CHRONICITY: Primary | ICD-10-CM

## 2024-11-19 DIAGNOSIS — R20.0 NUMBNESS AND TINGLING IN RIGHT HAND: ICD-10-CM

## 2024-11-19 DIAGNOSIS — M25.512 LEFT SHOULDER PAIN, UNSPECIFIED CHRONICITY: ICD-10-CM

## 2024-11-19 DIAGNOSIS — R20.2 NUMBNESS AND TINGLING IN RIGHT HAND: ICD-10-CM

## 2024-11-19 DIAGNOSIS — M75.01 ADHESIVE CAPSULITIS OF RIGHT SHOULDER: ICD-10-CM

## 2024-11-19 DIAGNOSIS — Z12.31 ENCOUNTER FOR SCREENING MAMMOGRAM FOR MALIGNANT NEOPLASM OF BREAST: Primary | ICD-10-CM

## 2024-11-19 RX ORDER — RIMEGEPANT SULFATE 75 MG/75MG
TABLET, ORALLY DISINTEGRATING ORAL
COMMUNITY
Start: 2024-09-09
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
## 2024-11-22 DIAGNOSIS — R20.0 NUMBNESS AND TINGLING IN RIGHT HAND: ICD-10-CM

## 2024-11-22 DIAGNOSIS — R20.2 NUMBNESS AND TINGLING IN RIGHT HAND: ICD-10-CM

## 2024-11-26 ENCOUNTER — OFFICE VISIT (OUTPATIENT)
Dept: ORTHOPEDIC SURGERY | Facility: CLINIC | Age: 49
End: 2024-11-26
Payer: OTHER GOVERNMENT

## 2024-11-26 VITALS — HEIGHT: 70 IN | WEIGHT: 219.8 LBS | BODY MASS INDEX: 31.47 KG/M2

## 2024-11-26 DIAGNOSIS — M75.111 INCOMPLETE TEAR OF RIGHT ROTATOR CUFF, UNSPECIFIED WHETHER TRAUMATIC: ICD-10-CM

## 2024-11-26 DIAGNOSIS — M25.511 RIGHT SHOULDER PAIN, UNSPECIFIED CHRONICITY: Primary | ICD-10-CM

## 2024-11-26 DIAGNOSIS — G56.21 CUBITAL TUNNEL SYNDROME ON RIGHT: ICD-10-CM

## 2024-11-26 DIAGNOSIS — M75.01 ADHESIVE CAPSULITIS OF RIGHT SHOULDER: ICD-10-CM

## 2024-11-26 DIAGNOSIS — G56.01 CARPAL TUNNEL SYNDROME OF RIGHT WRIST: ICD-10-CM

## 2024-11-26 PROCEDURE — 99213 OFFICE O/P EST LOW 20 MIN: CPT | Performed by: ORTHOPAEDIC SURGERY
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
## 2024-12-23 ENCOUNTER — OFFICE VISIT (OUTPATIENT)
Dept: FAMILY MEDICINE CLINIC | Facility: CLINIC | Age: 49
End: 2024-12-23
Payer: OTHER GOVERNMENT

## 2024-12-23 VITALS
WEIGHT: 220.4 LBS | HEART RATE: 78 BPM | HEIGHT: 70 IN | DIASTOLIC BLOOD PRESSURE: 78 MMHG | OXYGEN SATURATION: 98 % | BODY MASS INDEX: 31.55 KG/M2 | SYSTOLIC BLOOD PRESSURE: 132 MMHG

## 2024-12-23 DIAGNOSIS — Z00.00 ANNUAL PHYSICAL EXAM: Primary | ICD-10-CM

## 2024-12-23 DIAGNOSIS — Z12.4 SCREENING FOR CERVICAL CANCER: ICD-10-CM

## 2024-12-23 DIAGNOSIS — E78.2 MODERATE MIXED HYPERLIPIDEMIA NOT REQUIRING STATIN THERAPY: ICD-10-CM

## 2024-12-23 DIAGNOSIS — R32 URINARY INCONTINENCE, UNSPECIFIED TYPE: ICD-10-CM

## 2024-12-23 PROCEDURE — 99396 PREV VISIT EST AGE 40-64: CPT | Performed by: INTERNAL MEDICINE
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
## 2024-12-26 ENCOUNTER — LAB (OUTPATIENT)
Dept: FAMILY MEDICINE CLINIC | Facility: CLINIC | Age: 49
End: 2024-12-26
Payer: OTHER GOVERNMENT

## 2024-12-27 ENCOUNTER — HOSPITAL ENCOUNTER (OUTPATIENT)
Dept: MAMMOGRAPHY | Facility: HOSPITAL | Age: 49
Discharge: HOME | End: 2024-12-27
Admitting: INTERNAL MEDICINE
Payer: OTHER GOVERNMENT

## 2024-12-27 DIAGNOSIS — Z12.31 ENCOUNTER FOR SCREENING MAMMOGRAM FOR MALIGNANT NEOPLASM OF BREAST: ICD-10-CM

## 2024-12-27 LAB
CHOLEST SERPL-MCNC: 249 MG/DL (ref 100–199)
HDLC SERPL-MCNC: 37 MG/DL
LDLC SERPL CALC-MCNC: 147 MG/DL (ref 0–99)
TRIGL SERPL-MCNC: 350 MG/DL (ref 0–149)
VLDLC SERPL CALC-MCNC: 65 MG/DL (ref 5–40)

## 2024-12-27 PROCEDURE — 77067 SCR MAMMO BI INCL CAD: CPT

## 2024-12-27 PROCEDURE — 77063 BREAST TOMOSYNTHESIS BI: CPT

## 2024-12-30 LAB
CONV .: NORMAL
CYTOLOGIST CVX/VAG CYTO: NORMAL
CYTOLOGY CVX/VAG DOC CYTO: NORMAL
CYTOLOGY CVX/VAG DOC THIN PREP: NORMAL
DX ICD CODE: NORMAL
Lab: NORMAL
OTHER STN SPEC: NORMAL
STAT OF ADQ CVX/VAG CYTO-IMP: NORMAL

## 2025-01-09 ENCOUNTER — OFFICE VISIT (OUTPATIENT)
Dept: ORTHOPEDIC SURGERY | Facility: CLINIC | Age: 50
End: 2025-01-09
Payer: OTHER GOVERNMENT

## 2025-01-09 VITALS
WEIGHT: 220 LBS | DIASTOLIC BLOOD PRESSURE: 70 MMHG | BODY MASS INDEX: 31.5 KG/M2 | HEIGHT: 70 IN | SYSTOLIC BLOOD PRESSURE: 126 MMHG

## 2025-01-09 DIAGNOSIS — M25.511 RIGHT SHOULDER PAIN, UNSPECIFIED CHRONICITY: Primary | ICD-10-CM

## 2025-01-09 DIAGNOSIS — M75.01 ADHESIVE CAPSULITIS OF RIGHT SHOULDER: ICD-10-CM

## 2025-01-09 DIAGNOSIS — G56.01 CARPAL TUNNEL SYNDROME OF RIGHT WRIST: ICD-10-CM

## 2025-01-09 DIAGNOSIS — G56.21 CUBITAL TUNNEL SYNDROME ON RIGHT: ICD-10-CM

## 2025-01-09 DIAGNOSIS — M75.111 INCOMPLETE TEAR OF RIGHT ROTATOR CUFF, UNSPECIFIED WHETHER TRAUMATIC: ICD-10-CM

## 2025-01-09 PROCEDURE — 99212 OFFICE O/P EST SF 10 MIN: CPT | Performed by: ORTHOPAEDIC SURGERY
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
## 2025-02-11 RX ORDER — RIMEGEPANT SULFATE 75 MG/75MG
75 TABLET, ORALLY DISINTEGRATING ORAL DAILY PRN
Qty: 30 TABLET | Refills: 1 | Status: SHIPPED | OUTPATIENT
Start: 2025-02-11

## 2025-02-19 ENCOUNTER — TELEPHONE (OUTPATIENT)
Dept: FAMILY MEDICINE CLINIC | Facility: CLINIC | Age: 50
End: 2025-02-19

## 2025-02-19 DIAGNOSIS — R79.89 ABNORMAL TSH: Primary | ICD-10-CM

## 2025-02-19 NOTE — TELEPHONE ENCOUNTER
Caller: Alyssa Somers    Relationship: Self    Best call back number: [phone]     What is the best time to reach you: ANY    Who are you requesting to speak with (clinical staff, provider,  specific staff member): DR MAYO    Do you know the name of the person who called: PT    What was the call regarding: DR ARIANE LAND WANTED TO LET PCP KNOW THAT PT'S TSH LEVEL INCREASED TO 5.29. PLEASE ADVISE.

## 2025-02-20 ENCOUNTER — TELEPHONE (OUTPATIENT)
Dept: FAMILY MEDICINE CLINIC | Facility: CLINIC | Age: 50
End: 2025-02-20

## 2025-02-20 NOTE — TELEPHONE ENCOUNTER
Caller: Alyssa Somers    Relationship: Self    Best call back number: [phone]     Which medication are you concerned about: MYRBETRIQ AND GEMTESA    Who prescribed you this medication: DR MORRIS    When did you start taking this medication: NEW    What are your concerns: PT STATED ACCORDING TO HER CARDIOLOGIST THE MYRBETRIQ WILL COUNTER ACT WITH PT'S HEART MEDICATION AND PT CANNOT TAKE IT. PT'S UROLOGIST STATED THE GEMTESA WILL NOT INTERFERE WITH PT'S OTHER MEDICATIONS. LIVER SPECIALIST ALSO APPROVED THE GEMTESA. PT'S PCP WILL BE ASKED TO PRESCRIBE THE MEDICATION EVENTUALLY.

## 2025-02-21 NOTE — TELEPHONE ENCOUNTER
Unfortunately none  of those offices have sent me any recent notes. The only one I can pull through epic is the liver specialist. Please have someone from our staff call the urology office and cardiology office to get their most recent office notes    As far as starting the Gemtesa, that will initially need to come through urology.  Presumably if urology thinks she needs a new bladder medication, then the urologist should prescribe it initially and monitor effectiveness in the beginning before asking me to prescribe it

## 2025-02-21 NOTE — TELEPHONE ENCOUNTER
Lets repeat Tsh and a t4, as ce cannot assess a TSH by itself. Please schedule her for a lab visit

## 2025-02-24 NOTE — TELEPHONE ENCOUNTER
Spoke with patient. Relayed provider message. She was unsure of whom to reach out to so she will reach out to urology regarding the Gemtessa.

## 2025-02-27 ENCOUNTER — LAB (OUTPATIENT)
Dept: FAMILY MEDICINE CLINIC | Facility: CLINIC | Age: 50
End: 2025-02-27
Payer: OTHER GOVERNMENT

## 2025-02-28 LAB — TSH SERPL DL<=0.005 MIU/L-ACNC: 4.44 UIU/ML (ref 0.45–4.5)

## 2025-03-10 DIAGNOSIS — M79.7 FIBROMYALGIA: ICD-10-CM

## 2025-03-10 RX ORDER — DULOXETIN HYDROCHLORIDE 30 MG/1
30 CAPSULE, DELAYED RELEASE ORAL DAILY
Qty: 30 CAPSULE | Refills: 3 | Status: SHIPPED | OUTPATIENT
Start: 2025-03-10

## 2025-04-14 ENCOUNTER — TELEPHONE (OUTPATIENT)
Dept: FAMILY MEDICINE CLINIC | Facility: CLINIC | Age: 50
End: 2025-04-14

## 2025-04-14 DIAGNOSIS — M75.101 TEAR OF RIGHT SUPRASPINATUS TENDON: Primary | ICD-10-CM

## 2025-04-14 NOTE — TELEPHONE ENCOUNTER
Caller: [facility]    Relationship: Other    Best call back number: [phone]     What is the medical concern/diagnosis: RIGHT SHOULDER PAIN    What specialty or service is being requested: PHYSICAL THERAPY    What is the provider, practice or medical service name: [facility]    What is the office location: [address] [place] [address]    What is the office phone number: [phone]    Any additional details: [facility] STATES THE PATIENT HAS  INSURANCE. THEY TREATED HER FROM 11/26/24 TO 01/09/25. SHE HAD 10 VISITS WITH THEM. THEY WOULD LIKE TO SEE IF WE CAN SEND A RETRO-DATED REFERRAL/AUTHORIZATION FOR THESE VISITS. THAT CAN BE FAXED [phone].

## 2025-05-29 ENCOUNTER — TELEPHONE (OUTPATIENT)
Dept: FAMILY MEDICINE CLINIC | Facility: CLINIC | Age: 50
End: 2025-05-29
Payer: OTHER GOVERNMENT

## 2025-05-29 NOTE — TELEPHONE ENCOUNTER
Patient called and states that she is having numbing and tingling down her neck and her right arm. The patient refused the ER, and I asked Yolis to speak to the patient.

## 2025-05-30 NOTE — TELEPHONE ENCOUNTER
Spoke with patient yesterday and she is okay until the appointment I scheduled for her on 06/02. Dr. Hernandez approved the appointment in the same day

## 2025-06-02 ENCOUNTER — OFFICE VISIT (OUTPATIENT)
Dept: FAMILY MEDICINE CLINIC | Facility: CLINIC | Age: 50
End: 2025-06-02
Payer: OTHER GOVERNMENT

## 2025-06-02 VITALS
OXYGEN SATURATION: 99 % | DIASTOLIC BLOOD PRESSURE: 78 MMHG | HEIGHT: 70 IN | HEART RATE: 74 BPM | WEIGHT: 222 LBS | BODY MASS INDEX: 31.78 KG/M2 | SYSTOLIC BLOOD PRESSURE: 118 MMHG

## 2025-06-02 DIAGNOSIS — M54.2 NECK PAIN: ICD-10-CM

## 2025-06-02 DIAGNOSIS — R20.2 PARESTHESIA: ICD-10-CM

## 2025-06-02 DIAGNOSIS — R63.5 UNEXPLAINED WEIGHT GAIN: ICD-10-CM

## 2025-06-02 DIAGNOSIS — M75.101 TEAR OF RIGHT SUPRASPINATUS TENDON: Primary | ICD-10-CM

## 2025-06-02 DIAGNOSIS — M50.30 BULGING OF CERVICAL INTERVERTEBRAL DISC: ICD-10-CM
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
## 2025-06-03 LAB
ALBUMIN SERPL-MCNC: 4.4 G/DL (ref 3.8–4.9)
ALP SERPL-CCNC: 70 IU/L (ref 44–121)
ALT SERPL-CCNC: 31 IU/L (ref 0–32)
AST SERPL-CCNC: 38 IU/L (ref 0–40)
BASOPHILS # BLD AUTO: 0 X10E3/UL (ref 0–0.2)
BASOPHILS NFR BLD AUTO: 0 %
BILIRUB SERPL-MCNC: 0.4 MG/DL (ref 0–1.2)
BUN SERPL-MCNC: 11 MG/DL (ref 6–24)
BUN/CREAT SERPL: 15 (ref 9–23)
CALCIUM SERPL-MCNC: 9.8 MG/DL (ref 8.7–10.2)
CHLORIDE SERPL-SCNC: 103 MMOL/L (ref 96–106)
CO2 SERPL-SCNC: 23 MMOL/L (ref 20–29)
CREAT SERPL-MCNC: 0.74 MG/DL (ref 0.57–1)
EGFRCR SERPLBLD CKD-EPI 2021: 97 ML/MIN/1.73
EOSINOPHIL # BLD AUTO: 0.1 X10E3/UL (ref 0–0.4)
EOSINOPHIL NFR BLD AUTO: 2 %
ERYTHROCYTE [DISTWIDTH] IN BLOOD BY AUTOMATED COUNT: 12.1 % (ref 11.7–15.4)
FOLATE SERPL-MCNC: >20 NG/ML
GLOBULIN SER CALC-MCNC: 2.5 G/DL (ref 1.5–4.5)
GLUCOSE SERPL-MCNC: 88 MG/DL (ref 70–99)
HCT VFR BLD AUTO: 43.4 % (ref 34–46.6)
HGB BLD-MCNC: 14.3 G/DL (ref 11.1–15.9)
IMM GRANULOCYTES # BLD AUTO: 0 X10E3/UL (ref 0–0.1)
IMM GRANULOCYTES NFR BLD AUTO: 0 %
LYMPHOCYTES # BLD AUTO: 1.8 X10E3/UL (ref 0.7–3.1)
LYMPHOCYTES NFR BLD AUTO: 29 %
MCH RBC QN AUTO: 32.4 PG (ref 26.6–33)
MCHC RBC AUTO-ENTMCNC: 32.9 G/DL (ref 31.5–35.7)
MCV RBC AUTO: 98 FL (ref 79–97)
MONOCYTES # BLD AUTO: 0.5 X10E3/UL (ref 0.1–0.9)
MONOCYTES NFR BLD AUTO: 8 %
NEUTROPHILS # BLD AUTO: 3.8 X10E3/UL (ref 1.4–7)
NEUTROPHILS NFR BLD AUTO: 61 %
PLATELET # BLD AUTO: 270 X10E3/UL (ref 150–450)
POTASSIUM SERPL-SCNC: 5.4 MMOL/L (ref 3.5–5.2)
PROT SERPL-MCNC: 6.9 G/DL (ref 6–8.5)
RBC # BLD AUTO: 4.41 X10E6/UL (ref 3.77–5.28)
SODIUM SERPL-SCNC: 139 MMOL/L (ref 134–144)
T4 FREE SERPL-MCNC: 1.03 NG/DL (ref 0.82–1.77)
TSH SERPL DL<=0.005 MIU/L-ACNC: 4.55 UIU/ML (ref 0.45–4.5)
VIT B12 SERPL-MCNC: 638 PG/ML (ref 232–1245)
WBC # BLD AUTO: 6.3 X10E3/UL (ref 3.4–10.8)

## 2025-06-09 ENCOUNTER — PATIENT MESSAGE (OUTPATIENT)
Dept: FAMILY MEDICINE CLINIC | Facility: CLINIC | Age: 50
End: 2025-06-09
Payer: OTHER GOVERNMENT

## 2025-06-09 DIAGNOSIS — R20.2 PARESTHESIA: ICD-10-CM

## 2025-06-09 DIAGNOSIS — M54.2 NECK PAIN: Primary | ICD-10-CM

## 2025-06-09 DIAGNOSIS — M50.30 BULGING OF CERVICAL INTERVERTEBRAL DISC: ICD-10-CM

## 2025-06-25 ENCOUNTER — HOSPITAL ENCOUNTER (OUTPATIENT)
Dept: MRI IMAGING | Facility: HOSPITAL | Age: 50
Discharge: HOME | End: 2025-06-25
Payer: OTHER GOVERNMENT

## 2025-06-25 DIAGNOSIS — M50.30 BULGING OF CERVICAL INTERVERTEBRAL DISC: ICD-10-CM

## 2025-06-25 DIAGNOSIS — R20.2 PARESTHESIA: ICD-10-CM

## 2025-06-25 DIAGNOSIS — M54.2 NECK PAIN: ICD-10-CM

## 2025-06-25 PROCEDURE — 70551 MRI BRAIN STEM W/O DYE: CPT

## 2025-06-25 PROCEDURE — 72141 MRI NECK SPINE W/O DYE: CPT

## 2025-06-27 ENCOUNTER — PATIENT MESSAGE (OUTPATIENT)
Dept: FAMILY MEDICINE CLINIC | Facility: CLINIC | Age: 50
End: 2025-06-27
Payer: OTHER GOVERNMENT

## 2025-06-30 RX ORDER — FLUTICASONE PROPIONATE AND SALMETEROL 250; 50 UG/1; UG/1
1 POWDER RESPIRATORY (INHALATION)
Qty: 60 EACH | Refills: 6 | Status: SHIPPED | OUTPATIENT
Start: 2025-06-30

## 2025-07-02 ENCOUNTER — OFFICE VISIT (OUTPATIENT)
Dept: NEUROSURGERY | Facility: CLINIC | Age: 50
End: 2025-07-02
Payer: OTHER GOVERNMENT

## 2025-07-02 VITALS — WEIGHT: 220.6 LBS | BODY MASS INDEX: 31.58 KG/M2 | TEMPERATURE: 97.1 F | HEIGHT: 70 IN

## 2025-07-02 DIAGNOSIS — M54.2 NECK PAIN: Primary | ICD-10-CM

## 2025-07-02 DIAGNOSIS — M25.512 CHRONIC PAIN OF BOTH SHOULDERS: ICD-10-CM

## 2025-07-02 DIAGNOSIS — R20.0 NUMBNESS AND TINGLING IN RIGHT HAND: ICD-10-CM

## 2025-07-02 DIAGNOSIS — R20.2 NUMBNESS AND TINGLING IN RIGHT HAND: ICD-10-CM

## 2025-07-02 DIAGNOSIS — M25.511 CHRONIC PAIN OF BOTH SHOULDERS: ICD-10-CM

## 2025-07-02 DIAGNOSIS — G89.29 CHRONIC PAIN OF BOTH SHOULDERS: ICD-10-CM

## 2025-07-02 RX ORDER — ALBUTEROL SULFATE 90 UG/1
2 INHALANT RESPIRATORY (INHALATION) EVERY 4 HOURS PRN
Qty: 8 G | Refills: 0 | Status: SHIPPED | OUTPATIENT
Start: 2025-07-02

## 2025-07-02 RX ORDER — GABAPENTIN 100 MG/1
CAPSULE ORAL
Qty: 90 CAPSULE | Refills: 1 | Status: SHIPPED | OUTPATIENT
Start: 2025-07-02

## 2025-07-02 RX ORDER — PANTOPRAZOLE SODIUM 40 MG/1
40 TABLET, DELAYED RELEASE ORAL DAILY
COMMUNITY
Start: 2025-06-05 | End: 2026-06-05

## 2025-07-02 NOTE — PROGRESS NOTES
Patient: Alyssa Somers  : 1975  Chart #: [id number]    Date of Service: 2025    Chief Complaint: Neck pain, right arm numbness and tingling     HPI: Ms. Somers is a 50-year-old woman who works as a . She has known primary sclerosing cholangitis, migraine headaches, and a rapid heart rate with arrhythmia. She has been evaluated by orthopedic surgery for right shoulder dysfunction and a rotator cuff tear. She presents for neck and upper extremity complaints that started about 1 year ago and have progressively worsened in the last 6 months. She experiences neck pain and bilateral shoulder pain with right arm sensory alteration that extends into the lateral 3 fingers. She also feels/hears a grinding in her neck with most movements. Symptoms are aggravated by household chores, her desk work, but also even wake her at night at times. Sometimes she finds it difficult to hold onto objects on her right hand. She has left posterior shoulder pain, and has noticed some numbness in her left arm at times lately.     She participated in physical therapy for her right shoulder in late  but did some work on her neck too. She does home exercises and stretches periodically throughout the day nearly every day of the week. This was not helpful. She has not tried medicines like gabapentin. She avoids acetaminophen secondary to her liver disease and cannot take NSAIDs because they interfere with a medicine she takes for her heart. Muscle relaxants were unhelpful.     Review of Systems   Constitutional:  Negative for activity change, appetite change, chills, diaphoresis, fatigue, fever and unexpected weight change.   HENT:  Negative for congestion, dental problem, drooling, ear discharge, ear pain, facial swelling, hearing loss, mouth sores, nosebleeds, postnasal drip, rhinorrhea, sinus pressure, sinus pain, sneezing, sore throat, tinnitus, trouble swallowing and voice change.    Eyes:  Negative for  "photophobia, pain, discharge, redness, itching and visual disturbance.   Respiratory:  Negative for apnea, cough, choking, chest tightness, shortness of breath, wheezing and stridor.    Cardiovascular:  Negative for chest pain, palpitations and leg swelling.   Gastrointestinal:  Negative for abdominal distention, abdominal pain, anal bleeding, blood in stool, constipation, diarrhea, nausea, rectal pain and vomiting.   Endocrine: Negative for cold intolerance, heat intolerance, polydipsia, polyphagia and polyuria.   Genitourinary:  Negative for decreased urine volume, difficulty urinating, dyspareunia, dysuria, enuresis, flank pain, frequency, genital sores, hematuria, menstrual problem, pelvic pain, urgency, vaginal bleeding, vaginal discharge and vaginal pain.   Musculoskeletal:  Positive for neck pain and neck stiffness. Negative for arthralgias, back pain, gait problem, joint swelling and myalgias.   Skin:  Negative for color change, pallor, rash and wound.   Allergic/Immunologic: Negative for environmental allergies, food allergies and immunocompromised state.   Neurological:  Positive for weakness and numbness. Negative for dizziness, tremors, seizures, syncope, facial asymmetry, speech difficulty, light-headedness and headaches.   Hematological:  Negative for adenopathy. Does not bruise/bleed easily.   Psychiatric/Behavioral:  Negative for agitation, behavioral problems, confusion, decreased concentration, dysphoric mood, hallucinations, self-injury, sleep disturbance and suicidal ideas. The patient is not nervous/anxious and is not hyperactive.         The patient's past medical history, past surgical history, family history, and social history have been reviewed at length in the electronic medical record.     Physical examination:  Temperature 97.1 °F (36.2 °C), temperature source Infrared, height 177.8 cm (70\"), weight 100 kg (220 lb 9.6 oz), not currently breastfeeding.    Constitutional: The patient is " well-developed, well-nourished. She appears anxious about her health.     HEENT: normocephalic, atraumatic, sclera clear    MSK: Neck is supple.  FROM in cervical spine.  Tenderness to palpation in the midline cervical spine or paravertebral muscles is not reproduced.     Neurological Exam   A/A/C, speech clear, attention normal, conversant, answers questions appropriately, good historian.  Cranial nerves II through XII are intact.  Motor examination does not reveal weakness in the , upper or lower extremities. 5/5 throughout.   Sensation is intact.  Gait is normal, balance is normal.   No tremors are noted.  Reflexes are intact, 2+.   Ceron is negative. Clonus is negative.     Radiographic Imaging:  For my review MRI of the cervical spine dated 6/25/2025 demonstrate some straightening of the normal lordosis.  Alignment is otherwise normal.  Cord signal is normal.  There is mild facet arthropathy at multiple levels.  At C2-3, there is mild facet disease without canal stenosis or neuroforaminal narrowing.  At C3-4, mild facet disease and disc osteophyte complex coalescent to mild canal stenosis and mild neuroforaminal narrowing, perhaps slightly worse on the right.  At C5-6 and C6-7, disc osteophyte complexes with mild canal stenosis with no significant neuroforaminal narrowing.    There is mild cerebellar tonsillar ectopia noted in the MRI brain of the same date.  It does not crowd the spinal cord or the foramen magnum.  Brain is otherwise without significant abnormality.    Nerve conduction test by Ernst Castro dated 11/22/2024 demonstrated a mild carpal tunnel syndrome on the right, and mild ulnar neuropathy.     Medical Decision Making  Diagnoses and all orders for this visit:    1. Neck pain (Primary)  -     EMG & Nerve Conduction Test; Future    2. Numbness and tingling in right hand  -      Wrist Hand Orthosis, Wrist Extension Control Cock-up  -     EMG & Nerve Conduction Test; Future    3.  Chronic pain of both shoulders  -     EMG & Nerve Conduction Test; Future    I reviewed the patient's MRI studies with she and her  at the room at length.  I do not see an obvious role for neurosurgical intervention.  The patient is averse to pursuing injections and initially declined gabapentin, but decided at check out that she had changed her mind.  I will prescribe 100 mg of gabapentin for her to take 3 times daily.  For completeness, I will order nerve conduction studies, to look for a peripheral neuropathy.  I have also ordered a cock-up wrist splint for her to wear at night.  She will follow-up with me in 4-5 weeks with these studies and recommendations will be made at that time.    Any copied data from previous notes included in the (1) HPI, (2) PE, (3) MDM and/or assessment and plan has been reviewed and is accurate as of this day.    Anne Lehman PA-C     Patient Care Team:  Ana Hernandez MD as PCP - General (Internal Medicine & Pediatrics)

## 2025-07-20 DIAGNOSIS — M79.7 FIBROMYALGIA: ICD-10-CM

## 2025-07-21 RX ORDER — ALBUTEROL SULFATE 90 UG/1
2 INHALANT RESPIRATORY (INHALATION) EVERY 4 HOURS PRN
Qty: 8 G | Refills: 0 | OUTPATIENT
Start: 2025-07-21

## 2025-07-21 RX ORDER — DULOXETIN HYDROCHLORIDE 30 MG/1
30 CAPSULE, DELAYED RELEASE ORAL DAILY
Qty: 30 CAPSULE | Refills: 3 | Status: SHIPPED | OUTPATIENT
Start: 2025-07-21

## 2025-07-31 RX ORDER — GABAPENTIN 100 MG/1
CAPSULE ORAL
Qty: 90 CAPSULE | Refills: 1 | OUTPATIENT
Start: 2025-07-31

## 2025-07-31 NOTE — TELEPHONE ENCOUNTER
Patient states she did not request refill, this is automated from pharmacy. She has not taken any of the gabapentin yet so does not need a refill.

## 2025-08-18 ENCOUNTER — TELEPHONE (OUTPATIENT)
Dept: FAMILY MEDICINE CLINIC | Facility: CLINIC | Age: 50
End: 2025-08-18
Payer: OTHER GOVERNMENT